# Patient Record
Sex: MALE | NOT HISPANIC OR LATINO | Employment: OTHER | URBAN - METROPOLITAN AREA
[De-identification: names, ages, dates, MRNs, and addresses within clinical notes are randomized per-mention and may not be internally consistent; named-entity substitution may affect disease eponyms.]

---

## 2023-02-28 LAB
ALANINE AMINOTRANSFERASE (SGPT) (U/L) IN SER/PLAS: 25 U/L (ref 10–52)
ALBUMIN (G/DL) IN SER/PLAS: 3.9 G/DL (ref 3.4–5)
ALKALINE PHOSPHATASE (U/L) IN SER/PLAS: 67 U/L (ref 33–136)
ANION GAP IN SER/PLAS: 14 MMOL/L (ref 10–20)
ASPARTATE AMINOTRANSFERASE (SGOT) (U/L) IN SER/PLAS: 13 U/L (ref 9–39)
BILIRUBIN TOTAL (MG/DL) IN SER/PLAS: 0.7 MG/DL (ref 0–1.2)
CALCIUM (MG/DL) IN SER/PLAS: 9.5 MG/DL (ref 8.6–10.6)
CARBON DIOXIDE, TOTAL (MMOL/L) IN SER/PLAS: 33 MMOL/L (ref 21–32)
CHLORIDE (MMOL/L) IN SER/PLAS: 98 MMOL/L (ref 98–107)
CHOLESTEROL (MG/DL) IN SER/PLAS: 121 MG/DL (ref 0–199)
CHOLESTEROL IN HDL (MG/DL) IN SER/PLAS: 41.7 MG/DL
CHOLESTEROL/HDL RATIO: 2.9
CREATININE (MG/DL) IN SER/PLAS: 0.98 MG/DL (ref 0.5–1.3)
ERYTHROCYTE DISTRIBUTION WIDTH (RATIO) BY AUTOMATED COUNT: 12.1 % (ref 11.5–14.5)
ERYTHROCYTE MEAN CORPUSCULAR HEMOGLOBIN CONCENTRATION (G/DL) BY AUTOMATED: 33.8 G/DL (ref 32–36)
ERYTHROCYTE MEAN CORPUSCULAR VOLUME (FL) BY AUTOMATED COUNT: 92 FL (ref 80–100)
ERYTHROCYTES (10*6/UL) IN BLOOD BY AUTOMATED COUNT: 4.52 X10E12/L (ref 4.5–5.9)
GFR MALE: 79 ML/MIN/1.73M2
GLUCOSE (MG/DL) IN SER/PLAS: 226 MG/DL (ref 74–99)
HEMATOCRIT (%) IN BLOOD BY AUTOMATED COUNT: 41.7 % (ref 41–52)
HEMOGLOBIN (G/DL) IN BLOOD: 14.1 G/DL (ref 13.5–17.5)
LDL: 57 MG/DL (ref 0–99)
LEUKOCYTES (10*3/UL) IN BLOOD BY AUTOMATED COUNT: 8.9 X10E9/L (ref 4.4–11.3)
NRBC (PER 100 WBCS) BY AUTOMATED COUNT: 0 /100 WBC (ref 0–0)
PLATELETS (10*3/UL) IN BLOOD AUTOMATED COUNT: 197 X10E9/L (ref 150–450)
POTASSIUM (MMOL/L) IN SER/PLAS: 3.9 MMOL/L (ref 3.5–5.3)
PROSTATE SPECIFIC ANTIGEN,SCREEN: 0.94 NG/ML (ref 0–4)
PROTEIN TOTAL: 5.9 G/DL (ref 6.4–8.2)
SODIUM (MMOL/L) IN SER/PLAS: 141 MMOL/L (ref 136–145)
THYROTROPIN (MIU/L) IN SER/PLAS BY DETECTION LIMIT <= 0.05 MIU/L: 1.87 MIU/L (ref 0.44–3.98)
TRIGLYCERIDE (MG/DL) IN SER/PLAS: 110 MG/DL (ref 0–149)
UREA NITROGEN (MG/DL) IN SER/PLAS: 18 MG/DL (ref 6–23)
VLDL: 22 MG/DL (ref 0–40)

## 2023-03-21 ENCOUNTER — OFFICE VISIT (OUTPATIENT)
Dept: PRIMARY CARE | Facility: CLINIC | Age: 78
End: 2023-03-21
Payer: MEDICARE

## 2023-03-21 VITALS
RESPIRATION RATE: 12 BRPM | SYSTOLIC BLOOD PRESSURE: 125 MMHG | WEIGHT: 186 LBS | BODY MASS INDEX: 29.13 KG/M2 | DIASTOLIC BLOOD PRESSURE: 75 MMHG | HEART RATE: 72 BPM

## 2023-03-21 DIAGNOSIS — I10 BENIGN ESSENTIAL HYPERTENSION: ICD-10-CM

## 2023-03-21 DIAGNOSIS — E11.43 DIABETIC AUTONOMIC NEUROPATHY ASSOCIATED WITH TYPE 2 DIABETES MELLITUS (MULTI): Primary | ICD-10-CM

## 2023-03-21 PROBLEM — I48.0 PAROXYSMAL ATRIAL FIBRILLATION (MULTI): Status: ACTIVE | Noted: 2023-03-21

## 2023-03-21 PROBLEM — I95.1 ORTHOSTATIC HYPOTENSION: Status: ACTIVE | Noted: 2023-03-21

## 2023-03-21 PROBLEM — R06.02 EXERTIONAL SHORTNESS OF BREATH: Status: ACTIVE | Noted: 2023-03-21

## 2023-03-21 PROBLEM — B35.1 MYCOTIC TOENAILS: Status: ACTIVE | Noted: 2023-03-21

## 2023-03-21 PROBLEM — S09.90XA HEAD INJURY: Status: ACTIVE | Noted: 2023-03-21

## 2023-03-21 PROBLEM — J32.9 SINOBRONCHITIS: Status: ACTIVE | Noted: 2023-03-21

## 2023-03-21 PROBLEM — M79.672 BILATERAL LEG AND FOOT PAIN: Status: ACTIVE | Noted: 2023-03-21

## 2023-03-21 PROBLEM — L73.9 FOLLICULAR IMPETIGO: Status: ACTIVE | Noted: 2023-03-21

## 2023-03-21 PROBLEM — M17.11 PRIMARY OSTEOARTHRITIS OF RIGHT KNEE: Status: ACTIVE | Noted: 2023-03-21

## 2023-03-21 PROBLEM — M79.671 BILATERAL LEG AND FOOT PAIN: Status: ACTIVE | Noted: 2023-03-21

## 2023-03-21 PROBLEM — R06.00 DYSPNEA: Status: ACTIVE | Noted: 2023-03-21

## 2023-03-21 PROBLEM — N40.0 BPH (BENIGN PROSTATIC HYPERPLASIA): Status: ACTIVE | Noted: 2023-03-21

## 2023-03-21 PROBLEM — H02.403 PTOSIS OF BOTH EYELIDS: Status: ACTIVE | Noted: 2023-03-21

## 2023-03-21 PROBLEM — I87.2 VENOUS INSUFFICIENCY OF LEFT LOWER EXTREMITY: Status: ACTIVE | Noted: 2023-03-21

## 2023-03-21 PROBLEM — E11.9 DIABETES MELLITUS (MULTI): Status: ACTIVE | Noted: 2023-03-21

## 2023-03-21 PROBLEM — R53.1 GENERALIZED WEAKNESS: Status: ACTIVE | Noted: 2023-03-21

## 2023-03-21 PROBLEM — I89.0 LYMPHEDEMA OF LEG: Status: ACTIVE | Noted: 2023-03-21

## 2023-03-21 PROBLEM — R53.83 OTHER FATIGUE: Status: ACTIVE | Noted: 2023-03-21

## 2023-03-21 PROBLEM — N40.0 ENLARGED PROSTATE WITHOUT LOWER URINARY TRACT SYMPTOMS (LUTS): Status: ACTIVE | Noted: 2023-03-21

## 2023-03-21 PROBLEM — R55 SYNCOPE AND COLLAPSE: Status: ACTIVE | Noted: 2023-03-21

## 2023-03-21 PROBLEM — M25.569 KNEE PAIN: Status: ACTIVE | Noted: 2023-03-21

## 2023-03-21 PROBLEM — K59.00 CONSTIPATION: Status: ACTIVE | Noted: 2023-03-21

## 2023-03-21 PROBLEM — F41.8 DEPRESSION WITH ANXIETY: Status: ACTIVE | Noted: 2023-03-21

## 2023-03-21 PROBLEM — M70.20 OLECRANON BURSITIS: Status: ACTIVE | Noted: 2023-03-21

## 2023-03-21 PROBLEM — M54.50 LOWER BACK PAIN: Status: ACTIVE | Noted: 2023-03-21

## 2023-03-21 PROBLEM — M16.12 OSTEOARTHRITIS OF LEFT HIP: Status: ACTIVE | Noted: 2023-03-21

## 2023-03-21 PROBLEM — M79.605 BILATERAL LEG AND FOOT PAIN: Status: ACTIVE | Noted: 2023-03-21

## 2023-03-21 PROBLEM — M54.2 CERVICAL PAIN: Status: ACTIVE | Noted: 2023-03-21

## 2023-03-21 PROBLEM — R42 LIGHTHEADEDNESS: Status: ACTIVE | Noted: 2023-03-21

## 2023-03-21 PROBLEM — E06.9 THYROIDITIS: Status: ACTIVE | Noted: 2023-03-21

## 2023-03-21 PROBLEM — U07.1 COVID-19 VIRUS INFECTION: Status: ACTIVE | Noted: 2023-03-21

## 2023-03-21 PROBLEM — R63.4 WEIGHT LOSS: Status: ACTIVE | Noted: 2023-03-21

## 2023-03-21 PROBLEM — S40.022A CONTUSION OF LEFT ARM: Status: ACTIVE | Noted: 2023-03-21

## 2023-03-21 PROBLEM — M47.812 CERVICAL OSTEOARTHRITIS: Status: ACTIVE | Noted: 2023-03-21

## 2023-03-21 PROBLEM — J40 SINOBRONCHITIS: Status: ACTIVE | Noted: 2023-03-21

## 2023-03-21 PROBLEM — M79.602 PAIN OF LEFT UPPER EXTREMITY: Status: ACTIVE | Noted: 2023-03-21

## 2023-03-21 PROBLEM — G44.209 TENSION HEADACHE: Status: ACTIVE | Noted: 2023-03-21

## 2023-03-21 PROBLEM — N40.0 PROSTATISM: Status: ACTIVE | Noted: 2023-03-21

## 2023-03-21 PROBLEM — S80.10XA HEMATOMA OF LEG: Status: ACTIVE | Noted: 2023-03-21

## 2023-03-21 PROBLEM — M19.042 PRIMARY OSTEOARTHRITIS OF LEFT HAND: Status: ACTIVE | Noted: 2023-03-21

## 2023-03-21 PROBLEM — R25.1 TREMOR: Status: ACTIVE | Noted: 2023-03-21

## 2023-03-21 PROBLEM — N45.3 ORCHITIS AND EPIDIDYMITIS: Status: ACTIVE | Noted: 2023-03-21

## 2023-03-21 PROBLEM — M94.0 COSTOCHONDRITIS: Status: ACTIVE | Noted: 2023-03-21

## 2023-03-21 PROBLEM — E78.2 COMBINED HYPERLIPIDEMIA: Status: ACTIVE | Noted: 2023-03-21

## 2023-03-21 PROBLEM — M79.604 BILATERAL LEG AND FOOT PAIN: Status: ACTIVE | Noted: 2023-03-21

## 2023-03-21 PROBLEM — M25.519 SHOULDER PAIN: Status: ACTIVE | Noted: 2023-03-21

## 2023-03-21 PROCEDURE — 3078F DIAST BP <80 MM HG: CPT | Performed by: INTERNAL MEDICINE

## 2023-03-21 PROCEDURE — 99213 OFFICE O/P EST LOW 20 MIN: CPT | Performed by: INTERNAL MEDICINE

## 2023-03-21 PROCEDURE — 3074F SYST BP LT 130 MM HG: CPT | Performed by: INTERNAL MEDICINE

## 2023-03-21 RX ORDER — LOSARTAN POTASSIUM 25 MG/1
1 TABLET ORAL
COMMUNITY
Start: 2022-05-04 | End: 2024-03-18

## 2023-03-21 RX ORDER — FLUDROCORTISONE ACETATE 0.1 MG/1
0.5 TABLET ORAL 2 TIMES DAILY
COMMUNITY
Start: 2023-02-28 | End: 2023-03-21 | Stop reason: DRUGHIGH

## 2023-03-21 RX ORDER — ATORVASTATIN CALCIUM 10 MG/1
1 TABLET, FILM COATED ORAL
COMMUNITY
Start: 2014-02-18 | End: 2023-11-02

## 2023-03-21 RX ORDER — TAMSULOSIN HYDROCHLORIDE 0.4 MG/1
0.4 CAPSULE ORAL NIGHTLY
COMMUNITY
Start: 2018-03-29 | End: 2023-12-18 | Stop reason: ALTCHOICE

## 2023-03-21 RX ORDER — METFORMIN HYDROCHLORIDE 1000 MG/1
1 TABLET ORAL 2 TIMES DAILY
COMMUNITY
Start: 2023-02-03 | End: 2023-10-20 | Stop reason: SDUPTHER

## 2023-03-21 RX ORDER — DULOXETIN HYDROCHLORIDE 30 MG/1
30 CAPSULE, DELAYED RELEASE ORAL NIGHTLY
COMMUNITY
Start: 2018-07-10 | End: 2023-11-23

## 2023-03-21 RX ORDER — BLOOD SUGAR DIAGNOSTIC
STRIP MISCELLANEOUS
COMMUNITY
Start: 2014-10-29

## 2023-03-21 RX ORDER — FINASTERIDE 5 MG/1
1 TABLET, FILM COATED ORAL
COMMUNITY
Start: 2014-09-14 | End: 2023-11-23

## 2023-03-21 RX ORDER — BLOOD SUGAR DIAGNOSTIC
STRIP MISCELLANEOUS
COMMUNITY

## 2023-03-21 RX ORDER — DILTIAZEM HYDROCHLORIDE 360 MG/1
360 CAPSULE, EXTENDED RELEASE ORAL
COMMUNITY
End: 2023-12-14

## 2023-03-21 RX ORDER — FLUDROCORTISONE ACETATE 0.1 MG/1
TABLET ORAL
Qty: 45 TABLET | Refills: 1 | Status: SHIPPED | OUTPATIENT
Start: 2023-03-21 | End: 2023-04-16 | Stop reason: DRUGHIGH

## 2023-03-24 ENCOUNTER — TELEPHONE (OUTPATIENT)
Dept: PRIMARY CARE | Facility: CLINIC | Age: 78
End: 2023-03-24
Payer: MEDICARE

## 2023-03-25 DIAGNOSIS — R53.1 GENERALIZED WEAKNESS: ICD-10-CM

## 2023-03-25 DIAGNOSIS — I10 PRIMARY HYPERTENSION: ICD-10-CM

## 2023-03-25 DIAGNOSIS — R42 LIGHTHEADEDNESS: ICD-10-CM

## 2023-03-25 DIAGNOSIS — E11.43 DIABETIC AUTONOMIC NEUROPATHY ASSOCIATED WITH TYPE 2 DIABETES MELLITUS (MULTI): Primary | ICD-10-CM

## 2023-04-03 LAB
COBALAMIN (VITAMIN B12) (PG/ML) IN SER/PLAS: 298 PG/ML (ref 211–911)
POTASSIUM (MMOL/L) IN SER/PLAS: 3.5 MMOL/L (ref 3.5–5.3)

## 2023-04-06 LAB
ALBUMIN ELP: 3.6 G/DL (ref 3.4–5)
ALPHA 1: 0.2 G/DL (ref 0.2–0.6)
ALPHA 2: 0.6 G/DL (ref 0.4–1.1)
BETA: 0.6 G/DL (ref 0.5–1.2)
GAMMA GLOBULIN: 0.5 G/DL (ref 0.5–1.4)
PATH REVIEW - SERUM IMMUNOFIXATION: NORMAL
PATH REVIEW-SERUM PROTEIN ELECTROPHORESIS: NORMAL
PROTEIN ELECTROPHORESIS INTERPRETATION: NORMAL
PROTEIN TOTAL: 5.6 G/DL (ref 6.4–8.2)
SERUM IMMUNOFIXATION INTERPRETATION: NORMAL

## 2023-04-10 ENCOUNTER — TELEPHONE (OUTPATIENT)
Dept: PRIMARY CARE | Facility: CLINIC | Age: 78
End: 2023-04-10
Payer: MEDICARE

## 2023-04-16 RX ORDER — FLUDROCORTISONE ACETATE 0.1 MG/1
TABLET ORAL
Qty: 45 TABLET | Refills: 1 | Status: SHIPPED | OUTPATIENT
Start: 2023-04-16 | End: 2023-12-18 | Stop reason: ALTCHOICE

## 2023-04-28 ENCOUNTER — TELEPHONE (OUTPATIENT)
Dept: PRIMARY CARE | Facility: CLINIC | Age: 78
End: 2023-04-28
Payer: MEDICARE

## 2023-07-26 ENCOUNTER — OFFICE VISIT (OUTPATIENT)
Dept: PRIMARY CARE | Facility: CLINIC | Age: 78
End: 2023-07-26
Payer: MEDICARE

## 2023-07-26 VITALS — SYSTOLIC BLOOD PRESSURE: 148 MMHG | DIASTOLIC BLOOD PRESSURE: 82 MMHG

## 2023-07-26 DIAGNOSIS — I10 BENIGN ESSENTIAL HYPERTENSION: Primary | ICD-10-CM

## 2023-07-26 DIAGNOSIS — R42 LIGHTHEADEDNESS: ICD-10-CM

## 2023-07-26 DIAGNOSIS — E11.9 TYPE 2 DIABETES MELLITUS WITHOUT COMPLICATION, UNSPECIFIED WHETHER LONG TERM INSULIN USE (MULTI): ICD-10-CM

## 2023-07-26 PROCEDURE — 3077F SYST BP >= 140 MM HG: CPT | Performed by: INTERNAL MEDICINE

## 2023-07-26 PROCEDURE — 3079F DIAST BP 80-89 MM HG: CPT | Performed by: INTERNAL MEDICINE

## 2023-07-26 PROCEDURE — 99213 OFFICE O/P EST LOW 20 MIN: CPT | Performed by: INTERNAL MEDICINE

## 2023-07-26 NOTE — PROGRESS NOTES
Subjective   Patient ID: Guido Bell is a 77 y.o. male who presents for No chief complaint on file..    HPI follow-up visit and sick visit had some higher blood pressures crusting over 170s systolic recall he had low blood pressure when he was on double Flomax is now on a single Flomax in the morning and doing okay he was on 1-1/2 fludrocortisone at the same time and recently been to the neurologist because of higher blood pressure was reduced to 1/2 tablet of fludrocortisone was still taking potassium plans to get off the fludrocortisone altogether continues with the other blood pressure medication had no headache chest pain shortness of breath or swelling    Review of Systems    Objective   There were no vitals taken for this visit.  Vital signs noted alert and oriented x3 NCAT no JVD or bruit chest clear to auscultation and percussion CV regular rate and rhythm S1-S2 without murmur gallop or rub extremities no clubbing cyanosis or edema normal distal pulses  Physical Exam    Assessment/Plan impression hypertension orthostatic hypotension diabetes  Plan good diet regular exercise increase water consumption will not add additional blood pressure medicine because blood pressure is somewhat better today he is asymptomatic and he has ability to check his blood pressure at home after off of the fludrocortisone and potassium if needed then may reevaluate the blood pressure while checking blood sugars maintaining a diet good water consumption and regular exercise in the meantime recheck in 2 weeks

## 2023-08-24 ENCOUNTER — OFFICE VISIT (OUTPATIENT)
Dept: PRIMARY CARE | Facility: CLINIC | Age: 78
End: 2023-08-24
Payer: MEDICARE

## 2023-08-24 ENCOUNTER — APPOINTMENT (OUTPATIENT)
Dept: PRIMARY CARE | Facility: CLINIC | Age: 78
End: 2023-08-24
Payer: MEDICARE

## 2023-08-24 VITALS — WEIGHT: 185 LBS | BODY MASS INDEX: 28.98 KG/M2 | SYSTOLIC BLOOD PRESSURE: 126 MMHG | DIASTOLIC BLOOD PRESSURE: 76 MMHG

## 2023-08-24 DIAGNOSIS — I10 BENIGN ESSENTIAL HYPERTENSION: Primary | ICD-10-CM

## 2023-08-24 DIAGNOSIS — E11.9 TYPE 2 DIABETES MELLITUS WITHOUT COMPLICATION, UNSPECIFIED WHETHER LONG TERM INSULIN USE (MULTI): ICD-10-CM

## 2023-08-24 DIAGNOSIS — R42 LIGHTHEADEDNESS: ICD-10-CM

## 2023-08-24 LAB
ANION GAP IN SER/PLAS: 12 MMOL/L (ref 10–20)
CALCIUM (MG/DL) IN SER/PLAS: 9.6 MG/DL (ref 8.6–10.6)
CARBON DIOXIDE, TOTAL (MMOL/L) IN SER/PLAS: 32 MMOL/L (ref 21–32)
CHLORIDE (MMOL/L) IN SER/PLAS: 100 MMOL/L (ref 98–107)
CREATININE (MG/DL) IN SER/PLAS: 1.02 MG/DL (ref 0.5–1.3)
ESTIMATED AVERAGE GLUCOSE FOR HBA1C: 171 MG/DL
GFR MALE: 75 ML/MIN/1.73M2
GLUCOSE (MG/DL) IN SER/PLAS: 174 MG/DL (ref 74–99)
HEMOGLOBIN A1C/HEMOGLOBIN TOTAL IN BLOOD: 7.6 %
POTASSIUM (MMOL/L) IN SER/PLAS: 4.1 MMOL/L (ref 3.5–5.3)
SODIUM (MMOL/L) IN SER/PLAS: 140 MMOL/L (ref 136–145)
UREA NITROGEN (MG/DL) IN SER/PLAS: 21 MG/DL (ref 6–23)

## 2023-08-24 PROCEDURE — 3074F SYST BP LT 130 MM HG: CPT | Performed by: INTERNAL MEDICINE

## 2023-08-24 PROCEDURE — 99213 OFFICE O/P EST LOW 20 MIN: CPT | Performed by: INTERNAL MEDICINE

## 2023-08-24 PROCEDURE — 80048 BASIC METABOLIC PNL TOTAL CA: CPT

## 2023-08-24 PROCEDURE — 83036 HEMOGLOBIN GLYCOSYLATED A1C: CPT

## 2023-08-24 PROCEDURE — 3078F DIAST BP <80 MM HG: CPT | Performed by: INTERNAL MEDICINE

## 2023-08-24 NOTE — PROGRESS NOTES
Subjective   Patient ID: Guido Bell is a 77 y.o. male who presents for No chief complaint on file..    HPI follow-up visit has been doing well overall no episodes in the past month of the lightheadedness is off the fludrocortisone discussed with the Flomax and discussed with the blood sugars as well as the blood pressure medication no chest pain no shortness of breath no palpitation has been reasonably exercising seemingly eating okay perhaps not drinking enough water still    Review of Systems    Objective   There were no vitals taken for this visit.    Physical Exam vital signs noted alert and oriented x3 NCAT no JVD or bruit chest clear to auscultation and percussion CV regular rate and rhythm S1-S2 without murmur gallop or rub extremities no clubbing cyanosis or edema normal distal pulses    Assessment/Plan    impression diabetes mellitus hypertension and lightheadedness orthostasis  Plan check glycohemoglobin advised on long-term blood sugar test overall good diet regular exercise good water consumption weight stability or further weight loss continue with current medications check Chem-7 advised on glucose potassium sodium and kidney function and then follow-up for regular visit based on above and medications    Check previous medications (check)

## 2023-10-20 DIAGNOSIS — E11.9 TYPE 2 DIABETES MELLITUS WITHOUT COMPLICATION, UNSPECIFIED WHETHER LONG TERM INSULIN USE (MULTI): Primary | ICD-10-CM

## 2023-10-21 RX ORDER — METFORMIN HYDROCHLORIDE 1000 MG/1
1000 TABLET ORAL 2 TIMES DAILY
Qty: 180 TABLET | Refills: 0 | Status: SHIPPED | OUTPATIENT
Start: 2023-10-21 | End: 2024-01-10

## 2023-10-30 DIAGNOSIS — E78.2 MIXED HYPERLIPIDEMIA: ICD-10-CM

## 2023-11-02 RX ORDER — ATORVASTATIN CALCIUM 10 MG/1
10 TABLET, FILM COATED ORAL DAILY
Qty: 30 TABLET | Refills: 1 | Status: SHIPPED | OUTPATIENT
Start: 2023-11-02 | End: 2023-11-06 | Stop reason: SDUPTHER

## 2023-11-06 DIAGNOSIS — E78.2 MIXED HYPERLIPIDEMIA: ICD-10-CM

## 2023-11-06 RX ORDER — ATORVASTATIN CALCIUM 10 MG/1
10 TABLET, FILM COATED ORAL DAILY
Qty: 90 TABLET | Refills: 2 | Status: SHIPPED
Start: 2023-11-06 | End: 2023-12-18 | Stop reason: SDUPTHER

## 2023-11-14 DIAGNOSIS — I48.0 PAROXYSMAL ATRIAL FIBRILLATION (MULTI): Primary | ICD-10-CM

## 2023-11-17 DIAGNOSIS — Z00.00 ENCOUNTER FOR GENERAL ADULT MEDICAL EXAMINATION WITHOUT ABNORMAL FINDINGS: ICD-10-CM

## 2023-11-20 RX ORDER — APIXABAN 5 MG/1
5 TABLET, FILM COATED ORAL 2 TIMES DAILY
Qty: 180 TABLET | Refills: 3 | Status: SHIPPED | OUTPATIENT
Start: 2023-11-20

## 2023-11-22 ENCOUNTER — TELEPHONE (OUTPATIENT)
Dept: PRIMARY CARE | Facility: CLINIC | Age: 78
End: 2023-11-22
Payer: MEDICARE

## 2023-11-23 RX ORDER — DULOXETIN HYDROCHLORIDE 30 MG/1
30 CAPSULE, DELAYED RELEASE ORAL NIGHTLY
Qty: 90 CAPSULE | Refills: 1 | Status: SHIPPED | OUTPATIENT
Start: 2023-11-23

## 2023-11-23 RX ORDER — FINASTERIDE 5 MG/1
5 TABLET, FILM COATED ORAL DAILY
Qty: 90 TABLET | Refills: 1 | Status: SHIPPED | OUTPATIENT
Start: 2023-11-23 | End: 2024-04-09

## 2023-11-27 ENCOUNTER — APPOINTMENT (OUTPATIENT)
Dept: GASTROENTEROLOGY | Facility: CLINIC | Age: 78
End: 2023-11-27
Payer: MEDICARE

## 2023-12-11 DIAGNOSIS — I10 HYPERTENSION, UNSPECIFIED TYPE: Primary | ICD-10-CM

## 2023-12-11 DIAGNOSIS — E11.9 TYPE 2 DIABETES MELLITUS WITHOUT COMPLICATION, UNSPECIFIED WHETHER LONG TERM INSULIN USE (MULTI): Primary | ICD-10-CM

## 2023-12-12 RX ORDER — BLOOD-GLUCOSE METER
KIT MISCELLANEOUS
Qty: 100 STRIP | Refills: 3 | Status: SHIPPED | OUTPATIENT
Start: 2023-12-12

## 2023-12-14 RX ORDER — DILTIAZEM HYDROCHLORIDE 360 MG/1
360 CAPSULE, EXTENDED RELEASE ORAL DAILY
Qty: 90 CAPSULE | Refills: 3 | Status: SHIPPED | OUTPATIENT
Start: 2023-12-14 | End: 2023-12-21 | Stop reason: SDUPTHER

## 2023-12-16 ENCOUNTER — APPOINTMENT (OUTPATIENT)
Dept: RADIOLOGY | Facility: HOSPITAL | Age: 78
End: 2023-12-16
Payer: MEDICARE

## 2023-12-16 ENCOUNTER — HOSPITAL ENCOUNTER (EMERGENCY)
Facility: HOSPITAL | Age: 78
Discharge: HOME | End: 2023-12-16
Payer: MEDICARE

## 2023-12-16 VITALS
DIASTOLIC BLOOD PRESSURE: 94 MMHG | HEIGHT: 67 IN | TEMPERATURE: 98 F | SYSTOLIC BLOOD PRESSURE: 159 MMHG | HEART RATE: 81 BPM | WEIGHT: 184 LBS | RESPIRATION RATE: 16 BRPM | OXYGEN SATURATION: 97 % | BODY MASS INDEX: 28.88 KG/M2

## 2023-12-16 DIAGNOSIS — M79.605 PAIN OF LEFT LOWER EXTREMITY: Primary | ICD-10-CM

## 2023-12-16 PROCEDURE — 2500000004 HC RX 250 GENERAL PHARMACY W/ HCPCS (ALT 636 FOR OP/ED): Performed by: PHYSICIAN ASSISTANT

## 2023-12-16 PROCEDURE — 73552 X-RAY EXAM OF FEMUR 2/>: CPT | Mod: LT,FY

## 2023-12-16 PROCEDURE — 2500000005 HC RX 250 GENERAL PHARMACY W/O HCPCS: Performed by: PHYSICIAN ASSISTANT

## 2023-12-16 PROCEDURE — 99283 EMERGENCY DEPT VISIT LOW MDM: CPT

## 2023-12-16 PROCEDURE — 2500000001 HC RX 250 WO HCPCS SELF ADMINISTERED DRUGS (ALT 637 FOR MEDICARE OP): Performed by: PHYSICIAN ASSISTANT

## 2023-12-16 PROCEDURE — 73552 X-RAY EXAM OF FEMUR 2/>: CPT | Mod: LEFT SIDE | Performed by: RADIOLOGY

## 2023-12-16 RX ORDER — CYCLOBENZAPRINE HCL 10 MG
10 TABLET ORAL ONCE
Status: COMPLETED | OUTPATIENT
Start: 2023-12-16 | End: 2023-12-16

## 2023-12-16 RX ORDER — LIDOCAINE 560 MG/1
1 PATCH PERCUTANEOUS; TOPICAL; TRANSDERMAL ONCE
Status: DISCONTINUED | OUTPATIENT
Start: 2023-12-16 | End: 2023-12-16 | Stop reason: HOSPADM

## 2023-12-16 RX ORDER — PREDNISONE 20 MG/1
40 TABLET ORAL DAILY
Qty: 8 TABLET | Refills: 0 | Status: SHIPPED | OUTPATIENT
Start: 2023-12-16 | End: 2023-12-20

## 2023-12-16 RX ORDER — PREDNISONE 20 MG/1
60 TABLET ORAL ONCE
Status: COMPLETED | OUTPATIENT
Start: 2023-12-16 | End: 2023-12-16

## 2023-12-16 RX ORDER — LIDOCAINE 50 MG/G
1 PATCH TOPICAL DAILY
Qty: 30 PATCH | Refills: 0 | Status: SHIPPED | OUTPATIENT
Start: 2023-12-16 | End: 2024-01-02 | Stop reason: SDUPTHER

## 2023-12-16 RX ORDER — OXYCODONE AND ACETAMINOPHEN 5; 325 MG/1; MG/1
1 TABLET ORAL ONCE
Status: COMPLETED | OUTPATIENT
Start: 2023-12-16 | End: 2023-12-16

## 2023-12-16 RX ORDER — CYCLOBENZAPRINE HCL 10 MG
10 TABLET ORAL 3 TIMES DAILY PRN
Qty: 15 TABLET | Refills: 0 | Status: SHIPPED | OUTPATIENT
Start: 2023-12-16 | End: 2023-12-18 | Stop reason: ALTCHOICE

## 2023-12-16 RX ADMIN — CYCLOBENZAPRINE 10 MG: 10 TABLET, FILM COATED ORAL at 11:11

## 2023-12-16 RX ADMIN — OXYCODONE HYDROCHLORIDE AND ACETAMINOPHEN 1 TABLET: 5; 325 TABLET ORAL at 10:06

## 2023-12-16 RX ADMIN — LIDOCAINE 1 PATCH: 4 PATCH TOPICAL at 11:11

## 2023-12-16 RX ADMIN — PREDNISONE 60 MG: 20 TABLET ORAL at 10:06

## 2023-12-16 ASSESSMENT — COLUMBIA-SUICIDE SEVERITY RATING SCALE - C-SSRS
6. HAVE YOU EVER DONE ANYTHING, STARTED TO DO ANYTHING, OR PREPARED TO DO ANYTHING TO END YOUR LIFE?: NO
2. HAVE YOU ACTUALLY HAD ANY THOUGHTS OF KILLING YOURSELF?: NO
1. IN THE PAST MONTH, HAVE YOU WISHED YOU WERE DEAD OR WISHED YOU COULD GO TO SLEEP AND NOT WAKE UP?: NO

## 2023-12-16 ASSESSMENT — PAIN DESCRIPTION - LOCATION: LOCATION: LEG

## 2023-12-16 ASSESSMENT — PAIN - FUNCTIONAL ASSESSMENT
PAIN_FUNCTIONAL_ASSESSMENT: 0-10
PAIN_FUNCTIONAL_ASSESSMENT: 0-10

## 2023-12-16 ASSESSMENT — PAIN SCALES - GENERAL
PAINLEVEL_OUTOF10: 10 - WORST POSSIBLE PAIN
PAINLEVEL_OUTOF10: 8

## 2023-12-16 ASSESSMENT — PAIN DESCRIPTION - PAIN TYPE: TYPE: ACUTE PAIN

## 2023-12-16 NOTE — ED PROVIDER NOTES
HPI   Chief Complaint   Patient presents with    Leg Pain       Is a 78-year-old male coming in for left thigh pain.  He reports no injury.  It started 2 days ago.  Patient has had similar episodes before in the past however this 1 seems more severe.  He states its located to the lateral aspect of the left leg and radiates to the anterior aspect.  He states that he is been told its his sciatic nerve before in the past.  Patient has tried OTC medications for discomfort.  No alleviation.  He states his pain is worse with ambulating.  He does have a history of hip replacement to this left side.  Again he denies any falls or injury.  No incontinence.  No saddle anesthesia.  No other areas of pain or discomfort.      History provided by:  Patient                      No data recorded                Patient History   Past Medical History:   Diagnosis Date    Elevated prostate specific antigen (PSA)     Elevated prostate specific antigen (PSA)    Other disorders of intestinal carbohydrate absorption     Other disorders of intestinal carbohydrate absorption    Personal history of other diseases of male genital organs     History of benign prostatic hypertrophy    Personal history of other diseases of the circulatory system     History of hypertension    Personal history of other diseases of the nervous system and sense organs 09/09/2018    History of tension headache    Personal history of other endocrine, nutritional and metabolic disease     History of diabetes mellitus    Personal history of other endocrine, nutritional and metabolic disease     History of hyperlipidemia    Rectal polyp 07/11/2014    Rectal polyp    Spondylosis without myelopathy or radiculopathy, cervical region 06/02/2018    Cervical osteoarthritis     Past Surgical History:   Procedure Laterality Date    TOTAL HIP ARTHROPLASTY  12/09/2013    Total Hip Replacement     Family History   Problem Relation Name Age of Onset    Other (arteriosclerotic  cardiovascular disease) Father       Social History     Tobacco Use    Smoking status: Not on file    Smokeless tobacco: Not on file   Substance Use Topics    Alcohol use: Not on file    Drug use: Not on file       Physical Exam   ED Triage Vitals [12/16/23 0925]   Temp Heart Rate Resp BP   36.7 °C (98 °F) 88 20 (!) 163/97      SpO2 Temp Source Heart Rate Source Patient Position   97 % Temporal Monitor Sitting      BP Location FiO2 (%)     Left arm --       Physical Exam  Vitals and nursing note reviewed.   Constitutional:       General: He is not in acute distress.     Appearance: Normal appearance. He is not toxic-appearing.   HENT:      Head: Normocephalic and atraumatic.      Nose: Nose normal.      Mouth/Throat:      Mouth: Mucous membranes are moist.      Pharynx: Oropharynx is clear.   Eyes:      Extraocular Movements: Extraocular movements intact.      Conjunctiva/sclera: Conjunctivae normal.      Pupils: Pupils are equal, round, and reactive to light.   Cardiovascular:      Rate and Rhythm: Regular rhythm.      Pulses: Normal pulses.      Heart sounds: Normal heart sounds.   Pulmonary:      Effort: Pulmonary effort is normal. No respiratory distress.      Breath sounds: Normal breath sounds.   Abdominal:      General: Abdomen is flat. Bowel sounds are normal.      Palpations: Abdomen is soft.      Tenderness: There is no abdominal tenderness.   Musculoskeletal:         General: Normal range of motion.      Cervical back: Normal range of motion and neck supple.      Comments: No pain on palpation to the left lower extremity.  No pain with movement of the left lower extremity.  No obvious deformity.   Skin:     General: Skin is warm and dry.      Coloration: Skin is not jaundiced or pale.      Findings: No bruising.   Neurological:      General: No focal deficit present.      Mental Status: He is alert and oriented to person, place, and time. Mental status is at baseline.   Psychiatric:         Mood and  Affect: Mood normal.         Behavior: Behavior normal.         ED Course & MDM   ED Course as of 12/16/23 1046   Sat Dec 16, 2023   1035 XR femur left 2+ views [RS]      ED Course User Index  [RS] Alan Boo PA-C         Diagnoses as of 12/16/23 1046   Pain of left lower extremity       Medical Decision Making  Summary:  Medical Decision Making:   Patient presented as described in HPI. Patient case including ROS, PE, and treatment and plan discussed with ED attending if attached as cosigner. Results from labs and or imaging included below if completed. Guido Bell  is a 78 y.o. coming in for Patient presents with:  Leg Pain  .  Due to patient's history of surgical interventions imaging will be completed to the left femur.  There is no reproducible pain on palpation.  Patient is given a Percocet for his discomfort as well as steroids.  He has no weakness or decrease sensation to the leg.  Due to this patient is advised plan that includes pain control at home that include steroids, muscle relaxants, Tylenol, Lidoderm patches.  He will follow-up with his PCP or orthopedic doctor.  He states he has an orthopedic appointment on the 28th but will contact his PCP for further evaluation on Monday.    Patient was advised to follow up with PCP or recommended provider in 2-3 days for another evaluation and exam. I advised patient/guardian to return or go to closest emergency room immediately if symptoms change, get worse, new symptoms develop prior to follow up. If there is no improvement in symptoms in the next 24 hours they are advised to return for further evaluation and exam. I also explained the plan and treatment course. Patient/guardian is in agreement with plan, treatment course, and follow up and states verbally that they will comply.    Labs Reviewed - No data to display   XR femur left 2+ views   Final Result    Status post total left hip arthroplasty.          No sign of hardware failure.          Mild  osteopenia.          Nonaggressive periosteal reaction along the posterior cortex of the    mid and distal femoral diaphysis.          No sign of acute osseous abnormality.          Signed by: Cem Grossman 12/16/2023 10:33 AM    Dictation workstation:   [id number]          Tests/Medications/Escalations of Care considered but not given: As in MDM    Patient care discussed with: N/A  Social Determinants affecting care: N/A    Final diagnosis and disposition as documented       Homegoing. I discussed the differential; results and discharge plan with the patient and/or family/friend/caregiver if present.  I emphasized the importance of follow-up with the physician I referred them to in the timeframe recommended.  I explained reasons for the patient to return to the Emergency Department. They agreed that if they feel their condition is worsening or if they have any other concern they should call 911 immediately for further assistance. I gave the patient an opportunity to ask all questions they had and answered all of them accordingly. They understand return precautions and discharge instructions. The patient and/or family/friend/caregiver expressed understanding verbally and that they would comply.     Disposition: Discharge      This note has been transcribed using voice recognition and may contain grammatical errors, misplaced words, incorrect words, incorrect phrases or other errors.         Procedure  Procedures     Alan Boo PA-C  12/16/23 1058

## 2023-12-18 ENCOUNTER — OFFICE VISIT (OUTPATIENT)
Dept: CARDIOLOGY | Facility: HOSPITAL | Age: 78
End: 2023-12-18
Payer: MEDICARE

## 2023-12-18 VITALS
BODY MASS INDEX: 29.66 KG/M2 | HEART RATE: 95 BPM | HEIGHT: 67 IN | SYSTOLIC BLOOD PRESSURE: 171 MMHG | DIASTOLIC BLOOD PRESSURE: 91 MMHG | WEIGHT: 189 LBS

## 2023-12-18 DIAGNOSIS — I48.0 PAROXYSMAL ATRIAL FIBRILLATION (MULTI): Primary | ICD-10-CM

## 2023-12-18 DIAGNOSIS — E78.2 MIXED HYPERLIPIDEMIA: ICD-10-CM

## 2023-12-18 DIAGNOSIS — I10 HYPERTENSION, UNSPECIFIED TYPE: ICD-10-CM

## 2023-12-18 DIAGNOSIS — I10 PRIMARY HYPERTENSION: ICD-10-CM

## 2023-12-18 PROCEDURE — 3080F DIAST BP >= 90 MM HG: CPT | Performed by: INTERNAL MEDICINE

## 2023-12-18 PROCEDURE — 3077F SYST BP >= 140 MM HG: CPT | Performed by: INTERNAL MEDICINE

## 2023-12-18 PROCEDURE — 1159F MED LIST DOCD IN RCRD: CPT | Performed by: INTERNAL MEDICINE

## 2023-12-18 PROCEDURE — 99214 OFFICE O/P EST MOD 30 MIN: CPT | Performed by: INTERNAL MEDICINE

## 2023-12-18 PROCEDURE — 1036F TOBACCO NON-USER: CPT | Performed by: INTERNAL MEDICINE

## 2023-12-18 PROCEDURE — 1125F AMNT PAIN NOTED PAIN PRSNT: CPT | Performed by: INTERNAL MEDICINE

## 2023-12-18 PROCEDURE — 93005 ELECTROCARDIOGRAM TRACING: CPT | Performed by: INTERNAL MEDICINE

## 2023-12-18 RX ORDER — ATORVASTATIN CALCIUM 10 MG/1
10 TABLET, FILM COATED ORAL DAILY
Qty: 90 TABLET | Refills: 2 | Status: SHIPPED | OUTPATIENT
Start: 2023-12-18

## 2023-12-18 ASSESSMENT — ENCOUNTER SYMPTOMS
OCCASIONAL FEELINGS OF UNSTEADINESS: 0
DEPRESSION: 0
LOSS OF SENSATION IN FEET: 0

## 2023-12-18 NOTE — PROGRESS NOTES
"Chief Complaint:   No chief complaint on file.     History Of Present Illness:    Guido Bell is a 78 y.o. male here for followup. After an episode of persistent palpitations with malaise 5/2018 he was diagnosed with atrial fibrillation. Spontaneously converted to NSR. Had rate control with diltiazem gtt which was ultimately converted to oral. Was started on Eliquis for AC.      Reports doing well overall. Denies any recurrent atrial fibrillation events to the best of his knowledge. He denies CV symptoms. Does not drink alcohol. Has a Smartwatch for HR/arrythymia surveillance but has not set it up yet. We set it up together at today's visit. Has not checked home BP's as of late. Is on steroids for sciatica pain.            Last Recorded Vitals:  Vitals:    12/18/23 1339   BP: (!) 171/91   BP Location: Right arm   Patient Position: Sitting   Pulse: 95   Weight: 85.7 kg (189 lb)   Height: 1.702 m (5' 7\")             Allergies:  Patient has no known allergies.    Outpatient Medications:  Current Outpatient Medications   Medication Instructions    atorvastatin (LIPITOR) 10 mg, oral, Daily    blood sugar diagnostic (Accu-Chek Teresa Plus test strp) strip TEST AS DIRECTED, EVERY OTHER DAY AND AS NEEDED FOR LOW BLOOD SUGAR.    DOCUSATE CALCIUM ORAL 1 capsule, oral, Every Day, Docusate 240mg    DULoxetine (CYMBALTA) 30 mg, oral, Nightly    Eliquis 5 mg, oral, 2 times daily    finasteride (PROSCAR) 5 mg, oral, Daily    FreeStyle Lite Strips strip USE AS DIRECTED 1X PER DAY    FreeStyle Test strip miscellaneous, Use as directed 1x per  day    lidocaine (Lidoderm) 5 % patch 1 patch, transdermal, Daily, Remove & discard patch within 12 hours or as directed by MD.    losartan (Cozaar) 25 mg tablet 1 tablet, oral, Every Day    metFORMIN (GLUCOPHAGE) 1,000 mg, oral, 2 times daily    predniSONE (DELTASONE) 40 mg, oral, Daily    Tiadylt  mg, oral, Daily         Physical Exam:  Gen Well appearing septuagenarian male sitting " up in NAD. Body mass index is 29.6 kg/m².   CV rrr. No m/r/g appreciated. No JVD or leg edema. Pulses 2+ and symmetric.    Pulm Lungs clear with normal respiratory effort.  Neuro Alert and conversant. Grossly nonfocal.       I reviewed the patient's ECG -  NSR    I reviewed most recent imaging / labs / and office notes as well as details of any recent ED visits.    Assessment/Plan   1. Atrial fibrillation  Paroxysmal. No recent episodes. He has been symptomatic with recurrences in the past despite evidence of rate control. We had discussed rhythm control options and he has declined citing overall tolerance for recurrences. He was previously cautioned that in the future his episodes may be longer lasting and may not be tolerable and that rhythm control could be initiated should that occur. He is on Eliquis for AC and despite his prior bleeding issues and appears to be tolerating it well as of late though it is proving cost prohibitive. Will refer to the clinical pharmacy clinic for finance options.      2. HTN  BP uncontrolled today on steroids. He was instructed to check home BP's after he has completed his steroid course and call if they remain elevated (>140 mmhg systolic). Low threshold for increased losartan dosing.        Followup 6 mos.        Luis Fonseca MD

## 2023-12-19 ENCOUNTER — OFFICE VISIT (OUTPATIENT)
Dept: GASTROENTEROLOGY | Facility: HOSPITAL | Age: 78
End: 2023-12-19
Payer: MEDICARE

## 2023-12-19 VITALS — OXYGEN SATURATION: 96 % | HEART RATE: 80 BPM

## 2023-12-19 DIAGNOSIS — K59.09 OTHER CONSTIPATION: Primary | ICD-10-CM

## 2023-12-19 DIAGNOSIS — D12.4 ADENOMATOUS POLYP OF DESCENDING COLON: ICD-10-CM

## 2023-12-19 PROCEDURE — 1036F TOBACCO NON-USER: CPT

## 2023-12-19 PROCEDURE — 99214 OFFICE O/P EST MOD 30 MIN: CPT

## 2023-12-19 PROCEDURE — 1159F MED LIST DOCD IN RCRD: CPT

## 2023-12-19 PROCEDURE — 1125F AMNT PAIN NOTED PAIN PRSNT: CPT

## 2023-12-19 RX ORDER — SODIUM PICOSULFATE, MAGNESIUM OXIDE, AND ANHYDROUS CITRIC ACID 12; 3.5; 1 G/175ML; G/175ML; MG/175ML
2 LIQUID ORAL ONCE
Qty: 175 ML | Refills: 0 | Status: SHIPPED | OUTPATIENT
Start: 2023-12-19 | End: 2023-12-19

## 2023-12-19 ASSESSMENT — ENCOUNTER SYMPTOMS
ANAL BLEEDING: 0
CONSTIPATION: 1
VOMITING: 0
SHORTNESS OF BREATH: 0
BLOOD IN STOOL: 0
RECTAL PAIN: 0
ABDOMINAL PAIN: 0
ABDOMINAL DISTENTION: 0
NAUSEA: 0
COUGH: 0
CHILLS: 0
DIARRHEA: 0
FEVER: 0
APPETITE CHANGE: 0
FATIGUE: 0
TROUBLE SWALLOWING: 0

## 2023-12-19 NOTE — PATIENT INSTRUCTIONS
Please schedule your colonoscopy. You will need a ride since this involves sedation.  I will send a bowel prep to your pharmacy.  Clear liquid diet the day before the procedure. Start the 1st part of the prep at 6 pm the night prior and the other half 5 hrs before the procedure.  Please hold your Eliquis 2 days prior to your procedure.  Confirm with your prescribing physician.  I recommend taking 1 capful of Miralax daily in 8 oz of liquid  Follow up as needed

## 2023-12-19 NOTE — PROGRESS NOTES
Subjective     History of Present Illness:   Guido Bell is a 78 y.o. male with PMHx of BCC, HLD, constipation, costochondritis, diabetes, HTN, A-fib on Eliquis, weight loss who presents to GI clinic for further evaluation surveillance colonoscopy    Today, patient and his wife are present for visit.  States they are here to schedule a surveillance colonoscopy and patient is on Eliquis for A-fib.  Moves bowels every day but stool is hard and he strains.  Patient states that he is unable to tolerate GoLytely because it makes him vomit profusely.  Denies  diarrhea, dyspepsia, melena, hematochezia, dysphagia, unintentional weight loss    Denies ETOH, smoking, marijuana  Denies fxh GI cancer or IBD  Abdominal Surgeries: cholecystectomy    Last colonoscopy 10/2020 Dr. Aparicio for surveillance: Two 8 to 10 mm tubular adenoma descending and hepatic flexure, sigmoid and descending diverticulosis, internal hemorrhoids.  Repeat 3 years  Last EGD   2018 echocardiogram: Normal EF  Past Medical History  As per HPI.     Social History  he  reports that he has never smoked. He has never used smokeless tobacco.     Family History  his family history includes arteriosclerotic cardiovascular disease in his father.     Review of Systems  Review of Systems   Constitutional:  Negative for appetite change, chills, fatigue and fever.   HENT:  Negative for trouble swallowing.    Respiratory:  Negative for cough and shortness of breath.    Gastrointestinal:  Positive for constipation. Negative for abdominal distention, abdominal pain, anal bleeding, blood in stool, diarrhea, nausea, rectal pain and vomiting.       Allergies  No Known Allergies    Medications  Current Outpatient Medications   Medication Instructions    atorvastatin (LIPITOR) 10 mg, oral, Daily    blood sugar diagnostic (Accu-Chek Teresa Plus test strp) strip TEST AS DIRECTED, EVERY OTHER DAY AND AS NEEDED FOR LOW BLOOD SUGAR.    DOCUSATE CALCIUM ORAL 1 capsule, oral, Every  Day, Docusate 240mg    DULoxetine (CYMBALTA) 30 mg, oral, Nightly    Eliquis 5 mg, oral, 2 times daily    finasteride (PROSCAR) 5 mg, oral, Daily    FreeStyle Lite Strips strip USE AS DIRECTED 1X PER DAY    FreeStyle Test strip miscellaneous, Use as directed 1x per  day    lidocaine (Lidoderm) 5 % patch 1 patch, transdermal, Daily, Remove & discard patch within 12 hours or as directed by MD.    losartan (Cozaar) 25 mg tablet 1 tablet, oral, Every Day    metFORMIN (GLUCOPHAGE) 1,000 mg, oral, 2 times daily    predniSONE (DELTASONE) 40 mg, oral, Daily    Tiadylt  mg, oral, Daily        Objective   There were no vitals taken for this visit.   Physical Exam  Constitutional:       Appearance: Normal appearance. He is normal weight.   HENT:      Mouth/Throat:      Mouth: Mucous membranes are dry.      Pharynx: Oropharynx is clear.   Cardiovascular:      Rate and Rhythm: Normal rate and regular rhythm.   Pulmonary:      Effort: Pulmonary effort is normal.      Breath sounds: Normal breath sounds. No wheezing or rhonchi.   Abdominal:      General: Abdomen is flat. Bowel sounds are normal. There is no distension.      Palpations: Abdomen is soft. There is no hepatomegaly.      Tenderness: There is no abdominal tenderness. There is no guarding or rebound. Negative signs include Lanier's sign.      Hernia: No hernia is present.   Musculoskeletal:         General: Normal range of motion.   Skin:     General: Skin is warm and dry.   Neurological:      General: No focal deficit present.      Mental Status: He is alert and oriented to person, place, and time.   Psychiatric:         Mood and Affect: Mood normal.         Behavior: Behavior normal.           Lab Results   Component Value Date    WBC 8.9 02/28/2023    WBC 6.1 12/23/2021    WBC 5.7 10/28/2020    HGB 14.1 02/28/2023    HGB 13.8 12/23/2021    HGB 14.7 10/28/2020    HCT 41.7 02/28/2023    HCT 41.4 12/23/2021    HCT 43.4 10/28/2020     02/28/2023      (L) 12/23/2021     (L) 10/28/2020     Lab Results   Component Value Date     08/24/2023     02/28/2023     12/23/2021    K 4.1 08/24/2023    K 3.5 04/03/2023    K 3.9 02/28/2023     08/24/2023    CL 98 02/28/2023     12/23/2021    CO2 32 08/24/2023    CO2 33 (H) 02/28/2023    CO2 31 12/23/2021    BUN 21 08/24/2023    BUN 18 02/28/2023    BUN 27 (H) 12/23/2021    CREATININE 1.02 08/24/2023    CREATININE 0.98 02/28/2023    CREATININE 1.29 12/23/2021    CALCIUM 9.6 08/24/2023    CALCIUM 9.5 02/28/2023    CALCIUM 9.7 12/23/2021    PROT 5.6 (L) 04/03/2023    PROT 5.9 (L) 02/28/2023    PROT 6.1 (L) 12/23/2021    BILITOT 0.7 02/28/2023    BILITOT 0.6 12/23/2021    BILITOT 0.4 12/22/2020    ALKPHOS 67 02/28/2023    ALKPHOS 78 12/23/2021    ALKPHOS 73 12/22/2020    ALT 25 02/28/2023    ALT 17 12/23/2021    ALT 24 12/22/2020    AST 13 02/28/2023    AST 14 12/23/2021    AST 15 12/22/2020    GLUCOSE 174 (H) 08/24/2023    GLUCOSE 226 (H) 02/28/2023    GLUCOSE 159 (H) 12/23/2021           Guido Bell is a 78 y.o. male who presents to GI clinic for surveillance colonoscopy.    No problem-specific Assessment & Plan notes found for this encounter.         Laura Peres, APRN-CNP

## 2023-12-19 NOTE — ASSESSMENT & PLAN NOTE
-Schedule colonoscopy  -Recommendation to hold Eliquis 2 days prior to procedure    Follow-up as needed

## 2023-12-21 ENCOUNTER — TELEPHONE (OUTPATIENT)
Dept: PRIMARY CARE | Facility: CLINIC | Age: 78
End: 2023-12-21
Payer: MEDICARE

## 2023-12-21 RX ORDER — DILTIAZEM HYDROCHLORIDE 360 MG/1
360 CAPSULE, EXTENDED RELEASE ORAL DAILY
Qty: 90 CAPSULE | Refills: 3 | Status: SHIPPED | OUTPATIENT
Start: 2023-12-21

## 2023-12-22 ENCOUNTER — TELEPHONE (OUTPATIENT)
Dept: PRIMARY CARE | Facility: CLINIC | Age: 78
End: 2023-12-22

## 2023-12-27 ENCOUNTER — ANCILLARY PROCEDURE (OUTPATIENT)
Dept: RADIOLOGY | Facility: CLINIC | Age: 78
End: 2023-12-27
Payer: MEDICARE

## 2023-12-27 ENCOUNTER — OFFICE VISIT (OUTPATIENT)
Dept: PRIMARY CARE | Facility: CLINIC | Age: 78
End: 2023-12-27
Payer: MEDICARE

## 2023-12-27 VITALS — SYSTOLIC BLOOD PRESSURE: 152 MMHG | DIASTOLIC BLOOD PRESSURE: 80 MMHG

## 2023-12-27 DIAGNOSIS — E11.9 TYPE 2 DIABETES MELLITUS WITHOUT COMPLICATION, UNSPECIFIED WHETHER LONG TERM INSULIN USE (MULTI): ICD-10-CM

## 2023-12-27 DIAGNOSIS — I10 BENIGN ESSENTIAL HYPERTENSION: ICD-10-CM

## 2023-12-27 DIAGNOSIS — M54.32 LEFT SIDED SCIATICA: ICD-10-CM

## 2023-12-27 DIAGNOSIS — M54.32 LEFT SIDED SCIATICA: Primary | ICD-10-CM

## 2023-12-27 PROCEDURE — 1125F AMNT PAIN NOTED PAIN PRSNT: CPT | Performed by: INTERNAL MEDICINE

## 2023-12-27 PROCEDURE — 72100 X-RAY EXAM L-S SPINE 2/3 VWS: CPT | Performed by: RADIOLOGY

## 2023-12-27 PROCEDURE — 72100 X-RAY EXAM L-S SPINE 2/3 VWS: CPT

## 2023-12-27 PROCEDURE — 3079F DIAST BP 80-89 MM HG: CPT | Performed by: INTERNAL MEDICINE

## 2023-12-27 PROCEDURE — 3077F SYST BP >= 140 MM HG: CPT | Performed by: INTERNAL MEDICINE

## 2023-12-27 PROCEDURE — 1159F MED LIST DOCD IN RCRD: CPT | Performed by: INTERNAL MEDICINE

## 2023-12-27 PROCEDURE — 1036F TOBACCO NON-USER: CPT | Performed by: INTERNAL MEDICINE

## 2023-12-27 PROCEDURE — 99214 OFFICE O/P EST MOD 30 MIN: CPT | Performed by: INTERNAL MEDICINE

## 2023-12-27 NOTE — PROGRESS NOTES
Subjective   Patient ID: Guido Bell is a 78 y.o. male who presents for No chief complaint on file..    HPI follow-up visit status post recent emergency room had taken x-rays of his femur there was some reaction had taken steroids seemingly helped but not as much as the Lidoderm patches however the distribution of discomfort was in his left sciatic area which she had had before no chest pain no shortness of breath no polyuria polydipsia previous laboratory results reviewed as well as films    Review of Systems    Objective   There were no vitals taken for this visit.    Physical Exam vital signs noted alert and oriented x 3 NCAT no JVD chest clear to auscultation CV regular rate and rhythm S1-S2 extremities no clubbing cyanosis or edema normal distal pulses DTR 2+ on the right 0 on the left no foot drop LS-spine straighten curvature nontender spinous process negative straight leg raise negative logroll negative SI joint tenderness    Assessment/Plan impression hypertension diabetes left sciatica  Plan x-ray film AP lateral requisition made lumbar spine and advised on possibility probability of physical therapy medical spine clinic may still follow-up with orthopedics for the leg portion of his x-rays monitor blood sugar blood pressures have been modestly elevated while in pain may continue to check blood pressures at home and readvised on blood pressure medication diet while watching cho increasing water consumption and exercise after acute pain issue then follow-up for regular visit  [id number]

## 2024-01-02 ENCOUNTER — APPOINTMENT (OUTPATIENT)
Dept: PRIMARY CARE | Facility: CLINIC | Age: 79
End: 2024-01-02
Payer: MEDICARE

## 2024-01-02 DIAGNOSIS — M79.605 PAIN OF LEFT LOWER EXTREMITY: ICD-10-CM

## 2024-01-02 RX ORDER — LIDOCAINE 50 MG/G
1 PATCH TOPICAL DAILY
Qty: 30 PATCH | Refills: 0 | Status: SHIPPED | OUTPATIENT
Start: 2024-01-02 | End: 2024-01-03 | Stop reason: SDUPTHER

## 2024-01-03 DIAGNOSIS — M54.32 LEFT SIDED SCIATICA: Primary | ICD-10-CM

## 2024-01-03 DIAGNOSIS — M79.605 PAIN OF LEFT LOWER EXTREMITY: ICD-10-CM

## 2024-01-03 RX ORDER — LIDOCAINE 50 MG/G
2 PATCH TOPICAL DAILY
Qty: 60 PATCH | Refills: 0 | Status: SHIPPED | OUTPATIENT
Start: 2024-01-03 | End: 2024-02-02

## 2024-01-04 ENCOUNTER — APPOINTMENT (OUTPATIENT)
Dept: GASTROENTEROLOGY | Facility: CLINIC | Age: 79
End: 2024-01-04
Payer: MEDICARE

## 2024-01-08 DIAGNOSIS — E11.9 TYPE 2 DIABETES MELLITUS WITHOUT COMPLICATION, UNSPECIFIED WHETHER LONG TERM INSULIN USE (MULTI): ICD-10-CM

## 2024-01-09 ENCOUNTER — OFFICE VISIT (OUTPATIENT)
Dept: ORTHOPEDIC SURGERY | Facility: CLINIC | Age: 79
End: 2024-01-09
Payer: MEDICARE

## 2024-01-09 VITALS — BODY MASS INDEX: 29.66 KG/M2 | WEIGHT: 189 LBS | HEIGHT: 67 IN

## 2024-01-09 DIAGNOSIS — M54.16 LUMBAR RADICULOPATHY: ICD-10-CM

## 2024-01-09 DIAGNOSIS — M48.10 DISH (DIFFUSE IDIOPATHIC SKELETAL HYPEROSTOSIS): Primary | ICD-10-CM

## 2024-01-09 DIAGNOSIS — M76.32 ILIOTIBIAL BAND SYNDROME OF LEFT SIDE: ICD-10-CM

## 2024-01-09 DIAGNOSIS — D73.4 SPLENIC CYST: ICD-10-CM

## 2024-01-09 PROCEDURE — 99215 OFFICE O/P EST HI 40 MIN: CPT | Performed by: PHYSICIAN ASSISTANT

## 2024-01-09 PROCEDURE — 1036F TOBACCO NON-USER: CPT | Performed by: PHYSICIAN ASSISTANT

## 2024-01-09 PROCEDURE — 1159F MED LIST DOCD IN RCRD: CPT | Performed by: PHYSICIAN ASSISTANT

## 2024-01-09 PROCEDURE — 1125F AMNT PAIN NOTED PAIN PRSNT: CPT | Performed by: PHYSICIAN ASSISTANT

## 2024-01-09 PROCEDURE — 99205 OFFICE O/P NEW HI 60 MIN: CPT | Performed by: PHYSICIAN ASSISTANT

## 2024-01-09 RX ORDER — PREDNISONE 10 MG/1
TABLET ORAL
Qty: 50 TABLET | Refills: 0 | Status: SHIPPED | OUTPATIENT
Start: 2024-01-09 | End: 2024-01-20

## 2024-01-09 RX ORDER — SODIUM PICOSULFATE, MAGNESIUM OXIDE, AND ANHYDROUS CITRIC ACID 12; 3.5; 1 G/175ML; G/175ML; MG/175ML
LIQUID ORAL
COMMUNITY
Start: 2023-12-19

## 2024-01-09 ASSESSMENT — PAIN - FUNCTIONAL ASSESSMENT: PAIN_FUNCTIONAL_ASSESSMENT: 0-10

## 2024-01-09 ASSESSMENT — PAIN SCALES - GENERAL: PAINLEVEL_OUTOF10: 6

## 2024-01-09 NOTE — PROGRESS NOTES
HPI:  Guido Bell is a 78 y.o. male who presents to the spine clinic for evaluation of left leg pain.     Patient reports 3 weeks left thigh pain. Pain radiates lateral aspect of the spine anteriorly to the knee. Denies back pain. Denies pain below the knee. Denies numbness/tingling/weakness.   Pain started abruptly without trauma or inciting event. History of bilateral hip replacements which were uncomplicated.     Presented to ED 12/16/23 - images of left hip obtained and unremarkable. Sent home with scripts for Prednisone 40mg x 4 days, Flexeril, Lidocaine patches, and tylenol. Per patient the prednisone was mildly helpful, has not taken the flexeril. Lido patches have provided most relief.   PCP ordered xrays of the lumbar spine and recommend spine consult.    ROS:  Reviewed on EMR and patient intake sheet.    PMH/SH:  Reviewed on EMR and patient intake sheet.    Exam:  Physical Exam  Spine Musculoskeletal Exam    Well appearing, NAD  Pleasant & cooperative  BMI 29.6  normal ROM lumbar spine with rotation, flexion/extension  No TTP lumbar spine, hips bilaterally  lower extremity sensation intact  4/5 left HF; remaining extremity motor 5/5 throughout  normal gait & station  Negative SLR bilat  No pain with hip external rotation; Lateral thigh pain with internal rotation L hip    Radiology:     Xrays of the lumbar spine dated 12/27/23 personally reviewed. Mild scoliotic curvature. No obvious fractures. Multilevel bridging osteophytes consistent with DISH disease. Multilevel severe degenerative findings throughout the lumbar spine  Large splenic cyst measuring 122mm x 107mm on sagittal image.     Assessment and Plan:  DISH  Lumbar radiculopathy  IT band syndrome  Splenic cyst    I reviewed the imaging studies with Guido and his wife today in detail. Patient with multilevel severe degenerative changes consistent with DISH. DDX for the leg pain lumbar radiculopathy vs ITB syndrome.     Recommend referral for  outpatient PT and MRI lumbar spine. Patient requesting open MRI due to claustrophobia. Will also send in a longer Prednisone taper to see if this provides relief - patient instructed to carefully monitor blood sugars while taking oral steroids.     Of note, splenic mass noted on xrays today. Prior imaging reports state likely splenic cyst and initially noted on prior MRI abdomen dated 05/04/2011. Per patient he was unaware of the cyst and does not recall ever having it evaluated. Recommend follow up with PCP and will send secure chat.     Patient in agreement to plan of care. Of course Guido Bell is welcome to call at anytime with questions or concerns.     Tessie Rangel PA-C  Department of Orthopaedic Surgery    [address] [place] [address]    Voicemail: [phone]   Appts: [phone]  Fax: [phone]

## 2024-01-10 RX ORDER — METFORMIN HYDROCHLORIDE 1000 MG/1
1000 TABLET ORAL 2 TIMES DAILY
Qty: 180 TABLET | Refills: 0 | Status: SHIPPED | OUTPATIENT
Start: 2024-01-10 | End: 2024-04-05

## 2024-01-17 NOTE — PROGRESS NOTES
"Pharmacist Clinic: Anticoagulation Management  Guido Bell was referred to the Clinical Pharmacy Team for his anticoagulation management.    Referring Provider:  Luis Fonseca  _______________________________________________________________________  PHARMACY ASSESSMENT    Allergies Reviewed? No  Home Pharmacy Reviewed? No    Affordability/Accessibility: High copay ~$300 for Eliquis monthly  Adherence/Organization: Reported taking Eliquis twice a day  Adverse Effects: No significant side effects reported      RELEVANT LAB RESULTS  Lab Results   Component Value Date    BILITOT 0.7 02/28/2023    CALCIUM 9.6 08/24/2023    CO2 32 08/24/2023     08/24/2023    CREATININE 1.02 08/24/2023    GLUCOSE 174 (H) 08/24/2023    ALKPHOS 67 02/28/2023    K 4.1 08/24/2023    PROT 5.6 (L) 04/03/2023     08/24/2023    AST 13 02/28/2023    ALT 25 02/28/2023    BUN 21 08/24/2023    ANIONGAP 12 08/24/2023    MG 2.20 04/30/2018    ALBUMIN 3.9 02/28/2023    GFRMALE 75 08/24/2023     Lab Results   Component Value Date    TRIG 110 02/28/2023    CHOL 121 02/28/2023    HDL 41.7 02/28/2023     No results found for: \"BMCBC\", \"CBCDIF\"       DRUG INTERACTIONS  - No  _______________________________________________________________________  ANTICOAGULATION ASSESSMENT    The ASCVD Risk score (Da SOTO, et al., 2019) failed to calculate for the following reasons:    The valid total cholesterol range is 130 to 320 mg/dL    Unable to determine if patient is Non-     DIAGNOSIS: 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  - Patient is projected to be on anticoagulation indefinitely  - [id number] Score: [4] (only included if diagnosis is atrial fibrillation)   Age: [<65 (0)] [65-74 (+1)] [> 75 (+2)]: 2  Sex: [Male/Female (+1)]: 0  CHF history: [No/Yes(+1)]: 0  Hypertension history: [No/Yes(+1)]: 1  Stroke/TIA/thromboembolism history: [No/Yes(+2)]: 0  Vascular disease history (prior MI, " peripheral artery disease, aortic plaque): [No/Yes(+1)]: 0  Diabetes history: [No/Yes(+1)]: 1    CURRENT PHARMACOTHERAPY:   - Elqiuis 5 mg  - 78 years old  - 85.7 kg  - Scr 1.02 mg/dl (8/24/2024)    PERTINANT MEDICAL HISTORY:  - Medical history: PMH significant for hyperlipidemia, Afib, diabetes, orthostatic hypotension  - Medication history: started Eliquis 2-3 years ago for stroke prevention. No significant issue reported    _______________________________________________________________________  PATIENT EDUCATION/GOALS  - Counseled patient on MOA, expectations, duration of therapy, contraindications, administration, and monitoring parameters  - Counseled patient of side effects that are indicative of bleeding such as dark tarry stool, unexplainable bruising, or vomiting up a coffee ground like substance  - Answered all patient questions and concerns  _______________________________________________________________________  RECOMMENDATIONS/PLAN  1. Continue Eliquis 5 mg BID  2. Labs are up to date  3. Patient will follow up with clinical pharmacist once hear back regarding  PAP decision     Patient Assistance Program (PAP)    Patient verbally reports monthly or yearly income which is less than 400% federal poverty level    Application for program to be submitted for the following medications: Elqiuis    Prescription Insurance: Yes  Members of Household: 2  Files Taxes: Yes    Patient will be email financial information to pharmacist directly at [email or web address].    Patient aware this process may take up to 6 weeks.     If approved medication must be filled through [facility] pharmacy and mailed to patient.      Next Cardiology Appointment: 6/19/2024  Type of Encounter: Virtual    Sarwat Jesus PharmD    Verbal consent to manage patient's drug therapy was obtained from the patient . They were informed they may decline to participate or withdraw from participation in pharmacy services at any time.    Continue  all meds under the continuation of care with the referring provider and clinical pharmacy team.

## 2024-01-18 ENCOUNTER — TELEMEDICINE (OUTPATIENT)
Dept: PHARMACY | Facility: HOSPITAL | Age: 79
End: 2024-01-18
Payer: MEDICARE

## 2024-01-18 DIAGNOSIS — I48.0 PAROXYSMAL ATRIAL FIBRILLATION (MULTI): ICD-10-CM

## 2024-01-18 NOTE — Clinical Note
Jesús Fonseca. We are going to try to get Guido signed up for  PAP for his Eliquis. Will keep you updated.

## 2024-01-18 NOTE — PROGRESS NOTES
Physical Therapy  Physical Therapy Orthopedic Evaluation    Patient Name: Guido Bell  MRN: [id number]  Today's Date: 1/22/2024  Time Calculation  Start Time: 1255  Stop Time: 1335  Time Calculation (min): 40 min    Insurance:  Visit number: 1 of mn  Authorization info: no auth  Insurance Type: Payor: [facility] MEDICARE / Plan: [facility] MEDICARE / Product Type: *No Product type* /    Medicare auth:  1/22/24--3/18/24    Referring provider:  JORGE A Fraser  Onset date:  12/31/23    Current Problem  1. Lumbar radiculopathy  Referral to Physical Therapy    Follow Up In Physical Therapy        General:     Precautions:  PMH of hip replacements B  AFIB, diabetic   DISH: diffuse idiopathic skeletal hyperostosis    STEADI - 0  Medical History Form: Reviewed (scanned into chart)    Subjective   STACY: L leg pain, coming from the back he has been told, no back pain.  Insidious onset.  Just got up one morning.  He thinks he had an incident of LE numbness one time.    Chief Complaint: 12/31/23  Onset: 12/7/23    Pain:     Location: L lateral thigh  Description: aching; was throbbing.   Aggravating Factors: walking a lot in bare feet or in nonsupportive footwear.    Relieving Factors:  Prednisone, went to ER, been taking it since 1/9/24  Current: 2/10  Best: 0/10  Worse 10/10  Current Condition:   Better    Social Support: wife at home   Patients Living Environment: Reviewed and no concern    Functional limitations: able to do everything   Exercise/Recreational Activities: play golf  Work/School: retired  Patient's Goal(s) for Therapy: play golf without pain, not have pain or a manageable level of pain when the medication wears off.      Relevant Information (PMH & Previous Tests/Imaging): xrays:  DISH.   Previous Interventions/Treatments: MRI on Wednesday.     Primary Language: English      Red Flags: Do you have any of the following? No  Fever/chills, unexplained weight changes, dizziness/fainting, unexplained change in  bowel or bladder functions, unexplained malaise or muscle weakness, night pain/sweats, numbness or tingling    Objective:    Posture: flat lumbar spine, RS, FH, TK    Transfers:  independent    Gait: WNL     Lumbar ROM:  Flexion WNL, tight hamstrings  Extension 0  SB B 0  Rot 50% restriction B     LE MMT    R/L    hip flexion 4+/4  Ext  4-/3+  (seated)      IR 5/5      ER 5/4-     Abd 5/5; (seated)     Add 4+/4+ (seated)  knee ext 5/5     flex 5/5  ankle DF 5/5     PF 5/5    INV 5/5    EV 5/5    Flexibility:  Hamstring  B ++  Gastroc B ++    Slump: hamstring str but no pain  SLR:  +++    Outcome Measure:     GROC     Treatment:    Exercises  - Supine Bridge  - 1 x daily - 3 sets - 10 reps - 5 hold  - Standing Hip Flexor Stretch  - 1 x daily - 3 reps - 30 hold  - Supine Lower Trunk Rotation  - 2 x daily - 10-20 reps - 5 hold  - Standing Lumbar Extension with Counter  - 3 x daily - 10 reps - 3 hold    EDUCATION:   Access Code: [email or web address]    Home exercise program, plan of care, activity modifications, pain management, and injury pathology     Assessment: Patient presents with signs and symptoms consistent with left lumbar radiculopathy as a result of DISH, resulting in limited participation in pain-free ADLs and inability to perform at their prior level of function. Pt would benefit from physical therapy to address the impairments found & listed previously in the objective section in order to return to safe and pain-free ADLs and prior level of function.     Plan:     Planned Interventions include: therapeutic exercise, self-care home management, manual therapy, therapeutic activities, gait training, neuromuscular coordination, vasopneumatic, dry needling, aquatic therapy  Frequency: 1 x Week  Duration: 8 Weeks  Goals: Set and discussed today  Active       PT Problem       PT Goal 1       Start:  01/22/24    Expected End:  03/18/24       Pt will be independent with HEP  Pt will improve ROM of the lumbar spine by 25%  Pt  will improve strength of B glut max to 4+5; hip flexion and add to 5/5  Pt will demo negative SLR  Pt will report +4 on GROC  Pt will ambulate for 15-30' with 0-4/10 L LE pain               Plan of care was developed with input and agreement by the patient      Jerri Tapia, PT

## 2024-01-22 ENCOUNTER — EVALUATION (OUTPATIENT)
Dept: PHYSICAL THERAPY | Facility: CLINIC | Age: 79
End: 2024-01-22
Payer: MEDICARE

## 2024-01-22 DIAGNOSIS — M54.16 LUMBAR RADICULOPATHY: ICD-10-CM

## 2024-01-22 PROCEDURE — 97110 THERAPEUTIC EXERCISES: CPT | Mod: GP

## 2024-01-22 PROCEDURE — 97161 PT EVAL LOW COMPLEX 20 MIN: CPT | Mod: GP

## 2024-01-22 ASSESSMENT — PATIENT HEALTH QUESTIONNAIRE - PHQ9
1. LITTLE INTEREST OR PLEASURE IN DOING THINGS: NOT AT ALL
SUM OF ALL RESPONSES TO PHQ9 QUESTIONS 1 AND 2: 0
2. FEELING DOWN, DEPRESSED OR HOPELESS: NOT AT ALL

## 2024-01-22 ASSESSMENT — ENCOUNTER SYMPTOMS
LOSS OF SENSATION IN FEET: 0
DEPRESSION: 0
OCCASIONAL FEELINGS OF UNSTEADINESS: 0

## 2024-01-24 ENCOUNTER — TELEPHONE (OUTPATIENT)
Dept: ORTHOPEDIC SURGERY | Facility: CLINIC | Age: 79
End: 2024-01-24
Payer: MEDICARE

## 2024-01-24 ENCOUNTER — APPOINTMENT (OUTPATIENT)
Dept: RADIOLOGY | Facility: CLINIC | Age: 79
End: 2024-01-24
Payer: MEDICARE

## 2024-01-24 ENCOUNTER — TELEPHONE (OUTPATIENT)
Dept: PRIMARY CARE | Facility: CLINIC | Age: 79
End: 2024-01-24

## 2024-01-24 DIAGNOSIS — Z00.00 HEALTH CARE MAINTENANCE: Primary | ICD-10-CM

## 2024-01-24 NOTE — TELEPHONE ENCOUNTER
MRI lumbar denied by insurance - patient needs to complete 6 weeks of PT prior to MRI approval. Patient notified - will resubmit once PT completed.

## 2024-01-26 ENCOUNTER — DOCUMENTATION (OUTPATIENT)
Dept: PHYSICAL THERAPY | Facility: CLINIC | Age: 79
End: 2024-01-26

## 2024-01-26 NOTE — PROGRESS NOTES
Patient is wife presented to today's clinic.  He was doing his exercises at home last night.  He woke up this morning.  He had increased bruising throughout the right hamstring.  He notes that he has mild pain and discomfort.  He took a shower and noticed that the bruising became mildly worse.  I did inspect the patient's leg.  His leg is not overly warm.  No sign of infection, it is mildly tender to palpation.  Overall his vitals are stable, he is not in distress.  He does report being on Eliquis and being very prone to bruising.  However he does not recall if he bumped into anything.  I told him to rest ice and some gentle active range of motion, utilization of compression as needed.  Patient is to call the office or have a follow-up visit with his primary/urgent/ER if symptoms do worsen.  Patient and wife both verbally agreed

## 2024-01-31 ENCOUNTER — APPOINTMENT (OUTPATIENT)
Dept: RADIOLOGY | Facility: CLINIC | Age: 79
End: 2024-01-31
Payer: MEDICARE

## 2024-02-21 ENCOUNTER — APPOINTMENT (OUTPATIENT)
Dept: PHYSICAL THERAPY | Facility: CLINIC | Age: 79
End: 2024-02-21
Payer: MEDICARE

## 2024-03-18 DIAGNOSIS — I10 ESSENTIAL (PRIMARY) HYPERTENSION: Primary | ICD-10-CM

## 2024-03-18 RX ORDER — LOSARTAN POTASSIUM 25 MG/1
25 TABLET ORAL DAILY
Qty: 90 TABLET | Refills: 3 | Status: SHIPPED | OUTPATIENT
Start: 2024-03-18

## 2024-03-19 ENCOUNTER — APPOINTMENT (OUTPATIENT)
Dept: PHYSICAL THERAPY | Facility: CLINIC | Age: 79
End: 2024-03-19
Payer: MEDICARE

## 2024-03-20 ENCOUNTER — APPOINTMENT (OUTPATIENT)
Dept: PHYSICAL THERAPY | Facility: CLINIC | Age: 79
End: 2024-03-20
Payer: MEDICARE

## 2024-03-20 ENCOUNTER — APPOINTMENT (OUTPATIENT)
Dept: GASTROENTEROLOGY | Facility: EXTERNAL LOCATION | Age: 79
End: 2024-03-20
Payer: MEDICARE

## 2024-03-21 ENCOUNTER — DOCUMENTATION (OUTPATIENT)
Dept: PHYSICAL THERAPY | Facility: CLINIC | Age: 79
End: 2024-03-21
Payer: MEDICARE

## 2024-03-21 NOTE — PROGRESS NOTES
Patient Name: Guido Bell  MRN: [id number]  Today's Date: 3/21/2024  Initial examination date: 1/22/24  Referring provider: JORGE A Fraser  Onset date: 12/31/23    Date of discharge: 3/21/24  Date of last visit: 1/22/24  Number of attended visits: 1    Reason referred to physical therapy:  Acute lumbar radic    Therapy addressed the following problems/issues:  Unknown, pt attended IE only    Reason for discharge:    +failed to keep appointments; he canceled all of his fu appts and has not been to PT since January.     Jerri Tapia, PT

## 2024-03-27 ENCOUNTER — APPOINTMENT (OUTPATIENT)
Dept: GASTROENTEROLOGY | Facility: EXTERNAL LOCATION | Age: 79
End: 2024-03-27
Payer: MEDICARE

## 2024-03-27 ENCOUNTER — APPOINTMENT (OUTPATIENT)
Dept: PHYSICAL THERAPY | Facility: CLINIC | Age: 79
End: 2024-03-27
Payer: MEDICARE

## 2024-04-03 ENCOUNTER — APPOINTMENT (OUTPATIENT)
Dept: PHYSICAL THERAPY | Facility: CLINIC | Age: 79
End: 2024-04-03
Payer: MEDICARE

## 2024-04-05 DIAGNOSIS — E11.9 TYPE 2 DIABETES MELLITUS WITHOUT COMPLICATION, UNSPECIFIED WHETHER LONG TERM INSULIN USE (MULTI): ICD-10-CM

## 2024-04-05 RX ORDER — METFORMIN HYDROCHLORIDE 1000 MG/1
1000 TABLET ORAL 2 TIMES DAILY
Qty: 180 TABLET | Refills: 0 | Status: SHIPPED | OUTPATIENT
Start: 2024-04-05

## 2024-04-09 DIAGNOSIS — Z00.00 ENCOUNTER FOR GENERAL ADULT MEDICAL EXAMINATION WITHOUT ABNORMAL FINDINGS: ICD-10-CM

## 2024-04-09 RX ORDER — FINASTERIDE 5 MG/1
5 TABLET, FILM COATED ORAL DAILY
Qty: 90 TABLET | Refills: 1 | Status: SHIPPED | OUTPATIENT
Start: 2024-04-09

## 2024-05-15 ENCOUNTER — OFFICE VISIT (OUTPATIENT)
Dept: GASTROENTEROLOGY | Facility: EXTERNAL LOCATION | Age: 79
End: 2024-05-15
Payer: MEDICARE

## 2024-05-15 DIAGNOSIS — Z12.11 SCREEN FOR COLON CANCER: Primary | ICD-10-CM

## 2024-05-15 DIAGNOSIS — K57.30 DIVERTICULOSIS OF LARGE INTESTINE WITHOUT DIVERTICULITIS: ICD-10-CM

## 2024-05-15 DIAGNOSIS — D12.4 ADENOMATOUS POLYP OF DESCENDING COLON: ICD-10-CM

## 2024-05-15 DIAGNOSIS — Z86.010 PERSONAL HISTORY OF COLONIC POLYPS: ICD-10-CM

## 2024-05-15 DIAGNOSIS — D12.5 BENIGN NEOPLASM OF SIGMOID COLON: ICD-10-CM

## 2024-05-15 DIAGNOSIS — I48.91 ATRIAL FIBRILLATION, UNSPECIFIED TYPE (MULTI): ICD-10-CM

## 2024-05-15 DIAGNOSIS — D12.2 BENIGN NEOPLASM OF ASCENDING COLON: ICD-10-CM

## 2024-05-15 PROCEDURE — 1159F MED LIST DOCD IN RCRD: CPT | Performed by: INTERNAL MEDICINE

## 2024-05-15 PROCEDURE — 88305 TISSUE EXAM BY PATHOLOGIST: CPT

## 2024-05-15 PROCEDURE — 88305 TISSUE EXAM BY PATHOLOGIST: CPT | Performed by: PATHOLOGY

## 2024-05-15 PROCEDURE — 45385 COLONOSCOPY W/LESION REMOVAL: CPT | Performed by: INTERNAL MEDICINE

## 2024-05-16 ENCOUNTER — LAB REQUISITION (OUTPATIENT)
Dept: LAB | Facility: HOSPITAL | Age: 79
End: 2024-05-16
Payer: MEDICARE

## 2024-05-22 ENCOUNTER — TELEPHONE (OUTPATIENT)
Dept: GASTROENTEROLOGY | Facility: CLINIC | Age: 79
End: 2024-05-22
Payer: MEDICARE

## 2024-05-22 NOTE — TELEPHONE ENCOUNTER
----- Message from Charley Velasco MA sent at 5/22/2024  9:06 AM EDT -----  Please call [phone]

## 2024-05-22 NOTE — TELEPHONE ENCOUNTER
Phone - had colonoscopy 5/15, with 5 polyps removed - restarted Eliquis 5/16, and has had some BRBPR since - usually small amounts with brown BM's, had one larger episode - no blood for 2 days, no BM yet today - rec: call me if bleeding recurs

## 2024-05-22 NOTE — TELEPHONE ENCOUNTER
----- Message from Charley Velasco MA sent at 5/22/2024  9:06 AM EDT -----  Please call [phone]

## 2024-05-23 LAB
LABORATORY COMMENT REPORT: NORMAL
PATH REPORT.FINAL DX SPEC: NORMAL
PATH REPORT.GROSS SPEC: NORMAL
PATH REPORT.RELEVANT HX SPEC: NORMAL
PATH REPORT.TOTAL CANCER: NORMAL

## 2024-05-28 ENCOUNTER — OFFICE VISIT (OUTPATIENT)
Dept: PRIMARY CARE | Facility: CLINIC | Age: 79
End: 2024-05-28
Payer: MEDICARE

## 2024-05-28 VITALS
RESPIRATION RATE: 12 BRPM | BODY MASS INDEX: 29.44 KG/M2 | DIASTOLIC BLOOD PRESSURE: 100 MMHG | HEART RATE: 68 BPM | WEIGHT: 188 LBS | SYSTOLIC BLOOD PRESSURE: 155 MMHG

## 2024-05-28 DIAGNOSIS — I10 BENIGN ESSENTIAL HYPERTENSION: Primary | ICD-10-CM

## 2024-05-28 DIAGNOSIS — R53.83 OTHER FATIGUE: ICD-10-CM

## 2024-05-28 DIAGNOSIS — R06.09 OTHER FORM OF DYSPNEA: ICD-10-CM

## 2024-05-28 PROCEDURE — 3080F DIAST BP >= 90 MM HG: CPT | Performed by: INTERNAL MEDICINE

## 2024-05-28 PROCEDURE — 99213 OFFICE O/P EST LOW 20 MIN: CPT | Performed by: INTERNAL MEDICINE

## 2024-05-28 PROCEDURE — 3077F SYST BP >= 140 MM HG: CPT | Performed by: INTERNAL MEDICINE

## 2024-05-28 NOTE — PROGRESS NOTES
Subjective   Patient ID: Guido Bell is a 78 y.o. male who presents for No chief complaint on file..    HPI follow-up visit and sick visit same day after hours no staff no chest pain no shortness of breath but when he walks even several feet he becomes exhausted perhaps not dyspnea perhaps not so much tired he was able to play 7 holes of golf uncertain whether he meant physically or mentally exhausted but when he walks he then often has to sit down until it takes about 10 minutes to get himself back together he has had some episodes similar to this in the past once he was taken off Flomax and it seemed to help for about 1 year once he was on Florinef and that seemed to help before no longer helping blood sugars have been okay    Review of Systems    Objective   There were no vitals taken for this visit.    Physical Exam vital signs noted alert and oriented x 3 NCAT no JVD or bruit chest clear to auscultation and percussion no wheezing no crackles CV regular rate and rhythm S1-S2 without murmur gallop or rub abdomen soft nontender normal active bowel sounds LS spine normal curvature negative straight leg raise extremities no clubbing cyanosis there is venous stasis edema similar to before intact distal pulses DTR 2+    Impression diabetes mellitus fatigue versus hypertension versus dyspnea other diagnoses  Plan will obtain chest x-ray if not done recently requisition to be made  Assessment/Plan reviewed prior lumbar spine films reviewed prior head CT reviewed prior stress test he has a follow-up visit with cardiology in the coming weeks review if there has been an echocardiogram good diet good hydration exercise as able continue to monitor blood sugars at home and recheck based on above

## 2024-05-29 ENCOUNTER — HOSPITAL ENCOUNTER (OUTPATIENT)
Dept: RADIOLOGY | Facility: CLINIC | Age: 79
Discharge: HOME | End: 2024-05-29
Payer: MEDICARE

## 2024-05-29 DIAGNOSIS — I10 BENIGN ESSENTIAL HYPERTENSION: ICD-10-CM

## 2024-05-29 DIAGNOSIS — R06.09 OTHER FORM OF DYSPNEA: ICD-10-CM

## 2024-05-29 PROCEDURE — 71046 X-RAY EXAM CHEST 2 VIEWS: CPT

## 2024-05-29 PROCEDURE — 71046 X-RAY EXAM CHEST 2 VIEWS: CPT | Performed by: RADIOLOGY

## 2024-06-04 ENCOUNTER — LAB (OUTPATIENT)
Dept: LAB | Facility: LAB | Age: 79
End: 2024-06-04
Payer: MEDICARE

## 2024-06-04 ENCOUNTER — OFFICE VISIT (OUTPATIENT)
Dept: NEUROLOGY | Facility: CLINIC | Age: 79
End: 2024-06-04
Payer: MEDICARE

## 2024-06-04 VITALS
HEIGHT: 67 IN | WEIGHT: 187 LBS | SYSTOLIC BLOOD PRESSURE: 123 MMHG | DIASTOLIC BLOOD PRESSURE: 82 MMHG | BODY MASS INDEX: 29.35 KG/M2 | HEART RATE: 93 BPM

## 2024-06-04 DIAGNOSIS — I95.1 ORTHOSTATIC HYPOTENSION: ICD-10-CM

## 2024-06-04 DIAGNOSIS — I95.1 ORTHOSTATIC HYPOTENSION: Primary | ICD-10-CM

## 2024-06-04 DIAGNOSIS — G47.52 REM SLEEP BEHAVIOR DISORDER: ICD-10-CM

## 2024-06-04 LAB
ANION GAP SERPL CALC-SCNC: 14 MMOL/L (ref 10–20)
BUN SERPL-MCNC: 21 MG/DL (ref 6–23)
CALCIUM SERPL-MCNC: 9.7 MG/DL (ref 8.6–10.6)
CHLORIDE SERPL-SCNC: 100 MMOL/L (ref 98–107)
CO2 SERPL-SCNC: 28 MMOL/L (ref 21–32)
CREAT SERPL-MCNC: 1.15 MG/DL (ref 0.5–1.3)
EGFRCR SERPLBLD CKD-EPI 2021: 65 ML/MIN/1.73M*2
ERYTHROCYTE [DISTWIDTH] IN BLOOD BY AUTOMATED COUNT: 13.1 % (ref 11.5–14.5)
GLUCOSE SERPL-MCNC: 151 MG/DL (ref 74–99)
HCT VFR BLD AUTO: 36.5 % (ref 41–52)
HGB BLD-MCNC: 11.6 G/DL (ref 13.5–17.5)
MCH RBC QN AUTO: 29.9 PG (ref 26–34)
MCHC RBC AUTO-ENTMCNC: 31.8 G/DL (ref 32–36)
MCV RBC AUTO: 94 FL (ref 80–100)
NRBC BLD-RTO: 0 /100 WBCS (ref 0–0)
PLATELET # BLD AUTO: 198 X10*3/UL (ref 150–450)
POTASSIUM SERPL-SCNC: 4.1 MMOL/L (ref 3.5–5.3)
RBC # BLD AUTO: 3.88 X10*6/UL (ref 4.5–5.9)
SODIUM SERPL-SCNC: 138 MMOL/L (ref 136–145)
VIT B12 SERPL-MCNC: 1043 PG/ML (ref 211–911)
WBC # BLD AUTO: 5.7 X10*3/UL (ref 4.4–11.3)

## 2024-06-04 PROCEDURE — 36415 COLL VENOUS BLD VENIPUNCTURE: CPT

## 2024-06-04 PROCEDURE — 82607 VITAMIN B-12: CPT

## 2024-06-04 PROCEDURE — 80048 BASIC METABOLIC PNL TOTAL CA: CPT

## 2024-06-04 PROCEDURE — 99215 OFFICE O/P EST HI 40 MIN: CPT | Performed by: PSYCHIATRY & NEUROLOGY

## 2024-06-04 PROCEDURE — 1160F RVW MEDS BY RX/DR IN RCRD: CPT | Performed by: PSYCHIATRY & NEUROLOGY

## 2024-06-04 PROCEDURE — 85027 COMPLETE CBC AUTOMATED: CPT

## 2024-06-04 PROCEDURE — 3079F DIAST BP 80-89 MM HG: CPT | Performed by: PSYCHIATRY & NEUROLOGY

## 2024-06-04 PROCEDURE — 3074F SYST BP LT 130 MM HG: CPT | Performed by: PSYCHIATRY & NEUROLOGY

## 2024-06-04 PROCEDURE — 1159F MED LIST DOCD IN RCRD: CPT | Performed by: PSYCHIATRY & NEUROLOGY

## 2024-06-04 RX ORDER — PYRIDOSTIGMINE BROMIDE 60 MG/1
30 TABLET ORAL 3 TIMES DAILY
Qty: 45 TABLET | Refills: 2 | Status: SHIPPED | OUTPATIENT
Start: 2024-06-04 | End: 2024-09-02

## 2024-06-04 RX ORDER — TALC
3 POWDER (GRAM) TOPICAL NIGHTLY
Qty: 30 TABLET | Refills: 2 | Status: SHIPPED | OUTPATIENT
Start: 2024-06-04

## 2024-06-04 NOTE — PROGRESS NOTES
Subjective     Guido Bell is a 78 y.o. year old male seen in follow-up for history of orthostatic hypotension; currently noting fatigue with prolonged walking/standing, intermittent tremor predominantly of right hand, thrashing in sleep.    HPI    78-year-old right-handed man, retired , with past medical history significant for hypertension, type 2 diabetes that his wife indicates was diagnosed around 8-9 years ago, atrial fibrillation on Eliquis, hyperlipidemia, BPH, osteoarthritis, hip replacements, cholecystectomy.     I evaluated him initially on 4/3/2023. As detailed in my ambulatory EMR note from that date he presented with lightheadedness tentatively attributed to autonomic neuropathy in the context of diabetes. His symptoms began about 1 year prior to that evaluation. He endorsed positional lightheadedness noted while standing and relieved with sitting or lying down.     On testing in the office he was orthostatic by about 40 points systolic and 15 points diastolic after 1 minute of standing. There was a 9 point rise in heart rate between sitting and standing. I reviewed that he was on medications that could be contributing to orthostatic hypotension including antihypertensive medication, but with the seated blood pressure of 152/88 I did not suggest stopping his blood pressure medications. I also reviewed that tamsulosin could be a contributor and I suggested he try holding it unless it was necessary for bladder emptying. I reviewed conservative measures for management of orthostatic symptoms including hydration and use of an abdominal binder.     He was on a fludrocortisone regimen of 0.1 mg a.m. and 0.05 mg p.m. and I did not change it.     I sent him for labs including B12, SIEP and potassium level which were notable for B12 of 298 for which I started him on 1000 mcg orally daily replacement.    I evaluated him subsequently and most recently on 7/25/2023.  He indicated marked improvement  since stopping tamsulosin.  In fact his symptoms had entirely resolved.  He had not tried the abdominal binder or made any other changes to his medication regimen.  He was no longer noting positional lightheadedness.  He continued on fludrocortisone 0.1 mg a.m. and 0.05 mg p.m.  He was however significantly hypertensive both seated and standing at that visit and I advised him to taper off fludrocortisone if tolerated.  I advised him to stop potassium chloride as well.    He was to return on an as-needed basis and returns again today accompanied by his wife.    He indicates that he remained asymptomatic from the standpoint of orthostatic symptoms until about 1 month ago.  At that point, without any obvious precipitating factor (such as constitutional illness, medication change, vaccination) symptoms returned but somewhat differently than previous.  Whereas previously he was noting lightheadedness, over the past month he has noted a sense of being exhausted/fatigued.  This is with prolonged standing or after walking, sometimes even just after walking 10 or 12 feet.  There is no lightheadedness or other dizziness per se.    Yesterday he played 9 holes of golf and had no symptoms.  However, the week prior he experienced the above-noted exhaustion after playing about 7 holes.    He denies chest pain or dyspnea.  He is however pending an echocardiogram.    When he experiences this feeling of exhaustion while standing or walking, he stops and bends forward with hands resting on his thighs, and then is typically able to resume activity after couple of minutes.  However, sometimes he has to sit.  His legs can start to feel rubbery if he remains standing.    He has not fallen or fainted.    The above symptoms can occur throughout the course of the day but tend to be most pronounced after he takes his morning pills.    He remains off tamsulosin.  He is also off fludrocortisone.  He denies any new medications of significance  and any change in the doses of his blood pressure medications.  There has been no recent dramatic change in his weight.    He is trying to stay well-hydrated.  This includes both water and Gatorade.    He denies headaches or neck pain.    He denies neuropathic foot symptoms.    Some other concerns raised by the patient and his wife include a postural tremor that is either entirely or predominantly right sided, noted while holding items in front of him.  Per his wife the same tremor can occur at rest.    His wife also indicates that for years he has had episodes of yelling, thrashing or even falling out of bed in the middle of the night in his sleep.  He denies recall of dream content.  He has not been tried on any treatment for this complaint.    He reports chronic constipation.  He endorses nocturia typically 4-5 times and some difficulty starting a stream at times.  He continues on finasteride.       Review of Systems    As per the history of present illness    Patient Active Problem List   Diagnosis    Bilateral leg and foot pain    BPH (benign prostatic hyperplasia)    Cervical osteoarthritis    Cervical pain    Constipation    Combined hyperlipidemia    Contusion of left arm    Costochondritis    COVID-19 virus infection    Diabetes mellitus (Multi)    Dyspnea    Exertional shortness of breath    Enlarged prostate without lower urinary tract symptoms (luts)    Follicular impetigo    Head injury    Knee pain    Lightheadedness    Lower back pain    Hematoma of leg    Lymphedema of leg    Mycotic toenails    Olecranon bursitis    Orchitis and epididymitis    Osteoarthritis of left hip    Generalized weakness    Other fatigue    Pain of left upper extremity    Paroxysmal atrial fibrillation (Multi)    Primary osteoarthritis of left hand    Primary osteoarthritis of right knee    Prostatism    Ptosis of both eyelids    Sinobronchitis    Tension headache    Syncope and collapse    Thyroiditis    Tremor     Hypertension    Venous insufficiency of left lower extremity    Depression with anxiety    Diabetic autonomic neuropathy (Multi)    Orthostatic hypotension    Shoulder pain    Weight loss    Adenomatous polyp of descending colon    Lumbar radiculopathy     Past Medical History:   Diagnosis Date    Elevated prostate specific antigen (PSA)     Elevated prostate specific antigen (PSA)    Other disorders of intestinal carbohydrate absorption     Other disorders of intestinal carbohydrate absorption    Personal history of other diseases of male genital organs     History of benign prostatic hypertrophy    Personal history of other diseases of the circulatory system     History of hypertension    Personal history of other diseases of the nervous system and sense organs 09/09/2018    History of tension headache    Personal history of other endocrine, nutritional and metabolic disease     History of diabetes mellitus    Personal history of other endocrine, nutritional and metabolic disease     History of hyperlipidemia    Rectal polyp 07/11/2014    Rectal polyp    Spondylosis without myelopathy or radiculopathy, cervical region 06/02/2018    Cervical osteoarthritis     Past Surgical History:   Procedure Laterality Date    TOTAL HIP ARTHROPLASTY  12/09/2013    Total Hip Replacement     Social History     Tobacco Use    Smoking status: Never    Smokeless tobacco: Never   Substance Use Topics    Alcohol use: Not on file     family history includes arteriosclerotic cardiovascular disease in his father.    Current Outpatient Medications:     atorvastatin (Lipitor) 10 mg tablet, Take 1 tablet (10 mg) by mouth once daily., Disp: 90 tablet, Rfl: 2    blood sugar diagnostic (Accu-Chek Tersea Plus test strp) strip, TEST AS DIRECTED, EVERY OTHER DAY AND AS NEEDED FOR LOW BLOOD SUGAR., Disp: , Rfl:     Clenpiq 10 mg-3.5 gram- 12 gram/175 mL solution, PLEASE SEE ATTACHED FOR DETAILED DIRECTIONS, Disp: , Rfl:     dilTIAZem ER (Tiadylt ER)  360 mg 24 hr capsule, Take 1 capsule (360 mg) by mouth once daily., Disp: 90 capsule, Rfl: 3    DULoxetine (Cymbalta) 30 mg DR capsule, TAKE 1 CAPSULE BY MOUTH EVERY DAY AT NIGHT, Disp: 90 capsule, Rfl: 1    Eliquis 5 mg tablet, TAKE 1 TABLET BY MOUTH TWICE A DAY, Disp: 180 tablet, Rfl: 3    finasteride (Proscar) 5 mg tablet, TAKE 1 TABLET BY MOUTH EVERY DAY, Disp: 90 tablet, Rfl: 1    FreeStyle Lite Strips strip, USE AS DIRECTED 1X PER DAY, Disp: 100 strip, Rfl: 3    FreeStyle Test strip, Use as directed 1x per  day, Disp: , Rfl:     losartan (Cozaar) 25 mg tablet, TAKE 1 TABLET BY MOUTH EVERY DAY, Disp: 90 tablet, Rfl: 3    metFORMIN (Glucophage) 1,000 mg tablet, TAKE 1 TABLET BY MOUTH TWICE A DAY, Disp: 180 tablet, Rfl: 0  No Known Allergies    Objective   Neurological Exam  Physical Exam    Physical Examination:    General: Alert man who was ambulatory without assistive devices.      Cardiovascular: He became symptomatic after about 90 seconds of standing on orthostatic testing, feeling the above-noted exhaustion without ayla presyncopal lightheadedness.    Mental Status: Clear sensorium without fluctuation.  Appropriate in conversation.  Fluent unremarkable speech without paraphasic errors or hesitancy.  No bradyphrenia.    Cranial Nerves:  Funduscopic exam was not well visualized bilaterally on nondilated exam.  Pupils were equal, round and reactive to light with no relative afferent pupillary defect.  Extraocular movements were intact and conjugate without nystagmus.  No ptosis.  Visual fields were full to confrontation tested binocularly.  Facial sensation was symmetric to pin.  Facial motor function was symmetrically intact.  No hypomimia.  Hearing was grossly intact.  No dysarthria or hypophonia.  Shoulder shrug was symmetric.  Tongue protrusion was midline.    Motor: Muscle bulk and tone were normal throughout. There was no pronator drift or asymmetry of finger taps. Confrontation strength was  symmetrically 5/5 throughout the upper and lower extremities.     Coordination: Finger to finger, heel to shin, and alternating hand movements were rapid and accurate without dysmetria. There was no postural or rest tremor, myoclonus or dystonic posturing.  Repetitive hand movements were symmetric and rapid without bradykinesia.    Station: Intact and stable.    Gait: Stable and unremarkable.  Symmetric arm swing.  No shuffling, freezing or festination.  No tremor brought out by gait testing.        Assessment/Plan     He returns having seen me last in July 2023, at which point he reported complete resolution of orthostatic symptoms with stopping tamsulosin.  Symptoms returned about 1 month ago and although he complains of exhaustion rather than lightheadedness brought on by standing or walking, he is again orthostatic this time by about 50 points systolic and a little over 10 points diastolic.    We discussed that the basis for the return of his symptoms is unclear.    I discussed that he is hypertensive to a significant degree in the seated position (171/93) and because of this I am reluctant to recommend drastic reductions in his blood pressure medication regimen, or to start him directly back on fludrocortisone or similar agent.    I recommended first trying pyridostigmine 30 mg 3 times daily (which I discussed may incidentally help with constipation), to continue aggressive hydration, and to have him start using the abdominal binder.  I again reviewed the rationale for the abdominal binder and he indicated he will obtain this.     If the above measures are inadequate then we could resume a low dose of fludrocortisone but his blood pressures will need to be monitored.    I recommended checking labs including CBC, BMP and B12 level.  The office will contact him with results.  His wife asked about whether severe anemia could cause similar symptoms and it could be a contributor but would not frankly account for  orthostasis.    He is pending an echocardiogram which I encouraged him to follow through with.    We discussed some other complaints that surfaced at today's evaluation, raising question of a synucleinopathy.  Specifically, in the context of a history of orthostatic hypotension he also has history suggestive of REM behavior disorder and (by report of him and his wife but not evident on exam today) a rest and postural emergent tremor predominantly involving the right upper extremity.  Although I discussed with him and his wife that these features could be manifestations of a synucleinopathy such as PD, in fact on exam he has no rigidity, bradykinesia/hypokinesia, or gait/postural abnormality.  I am not even able to appreciate a tremor on exam today.  Accordingly, although he may ultimately evolve to a condition such as PD, at this point the significance of this constellation cannot be determined.    For management of symptoms suggestive of REM behavior disorder I recommended starting with melatonin 3 mg nightly.  I advised that he contact me regarding his response and if it is inadequate then we can titrate by 3 mg increments.    I advised him to follow-up in the office in 3 months.

## 2024-06-04 NOTE — PATIENT INSTRUCTIONS
We discussed that I suspect orthostatic hypotension is playing a significant role in your symptoms, even though you are experiencing fatigue rather than lightheadedness per se.  I do agree with the recommendation for an echocardiogram.  I am sending you for a few blood tests.    Since your blood pressure is often high (including today in the seated position), I am reluctant to recommend reducing blood pressure medication doses until you clear this with your primary doctor.  We would also need to be cautious about starting you on orthostatic hypotension medications that drive up the blood pressure.    Accordingly I recommend, in addition to continued attention to staying well-hydrated, that you start wearing an abdominal binder (you can obtain this at the [employer], medical supply store or online) whenever you are upright.  I also recommend starting pyridostigmine half a tablet 3 times a day.  This is a medication that helps some people with orthostatic hypotension and does not drive up the blood pressure dangerously.  If you have any trouble tolerating pyridostigmine, contact my office.    We discussed that you have episodes of thrashing in your sleep and even falling out of bed and this suggests REM behavior disorder.  The first-line of treatment is melatonin and I am prescribing a 3 mg dose at bedtime to start with.  If the episodes continue despite this dose, contact my office and we will increase it.    You have noted an intermittent tremor but I do not find any solid evidence of parkinsonism on your exam today.    Please see me in the office in 3 months.

## 2024-06-05 ENCOUNTER — TELEPHONE (OUTPATIENT)
Dept: PRIMARY CARE | Facility: CLINIC | Age: 79
End: 2024-06-05

## 2024-06-05 ENCOUNTER — ANCILLARY PROCEDURE (OUTPATIENT)
Dept: CARDIOLOGY | Facility: CLINIC | Age: 79
End: 2024-06-05
Payer: MEDICARE

## 2024-06-05 ENCOUNTER — TELEPHONE (OUTPATIENT)
Dept: NEUROLOGY | Facility: CLINIC | Age: 79
End: 2024-06-05
Payer: MEDICARE

## 2024-06-05 DIAGNOSIS — R06.09 OTHER FORM OF DYSPNEA: ICD-10-CM

## 2024-06-05 DIAGNOSIS — I10 BENIGN ESSENTIAL HYPERTENSION: ICD-10-CM

## 2024-06-05 DIAGNOSIS — R06.00 DYSPNEA, UNSPECIFIED: ICD-10-CM

## 2024-06-05 LAB
AORTIC VALVE MEAN GRADIENT: 3.5 MMHG
AORTIC VALVE PEAK VELOCITY: 1.16 M/S
AV PEAK GRADIENT: 5.4 MMHG
AVA (PEAK VEL): 4.92 CM2
AVA (VTI): 5.29 CM2
EJECTION FRACTION APICAL 4 CHAMBER: 62
LEFT ATRIUM VOLUME AREA LENGTH INDEX BSA: 30.2 ML/M2
LEFT VENTRICLE INTERNAL DIMENSION DIASTOLE: 4.16 CM (ref 3.5–6)
LEFT VENTRICULAR OUTFLOW TRACT DIAMETER: 2.65 CM
LV EJECTION FRACTION BIPLANE: 61 %
MITRAL VALVE E/A RATIO: 0.46
MITRAL VALVE E/E' RATIO: 14.84
TRICUSPID ANNULAR PLANE SYSTOLIC EXCURSION: 2.1 CM

## 2024-06-05 PROCEDURE — 93306 TTE W/DOPPLER COMPLETE: CPT | Performed by: INTERNAL MEDICINE

## 2024-06-05 PROCEDURE — 93306 TTE W/DOPPLER COMPLETE: CPT

## 2024-06-05 NOTE — TELEPHONE ENCOUNTER
----- Message from Garry Guzman MD sent at 6/5/2024  1:23 PM EDT -----  His labs show mild anemia which he should discuss with his primary doctor.  B12 level is good.  Metabolic panel is normal except for elevated glucose as was seen on his prior metabolic panels.  ----- Message -----  From: Mark Roth  Sent: 6/5/2024  11:44 AM EDT  To: Garry Guzman MD    Patient called about lab results completed yesterday

## 2024-06-07 ENCOUNTER — OFFICE VISIT (OUTPATIENT)
Dept: PRIMARY CARE | Facility: CLINIC | Age: 79
End: 2024-06-07
Payer: MEDICARE

## 2024-06-07 VITALS — SYSTOLIC BLOOD PRESSURE: 122 MMHG | DIASTOLIC BLOOD PRESSURE: 72 MMHG

## 2024-06-07 DIAGNOSIS — E11.43 DIABETIC AUTONOMIC NEUROPATHY ASSOCIATED WITH TYPE 2 DIABETES MELLITUS (MULTI): ICD-10-CM

## 2024-06-07 DIAGNOSIS — I34.81 MITRAL ANNULAR CALCIFICATION: Primary | ICD-10-CM

## 2024-06-07 PROCEDURE — 3074F SYST BP LT 130 MM HG: CPT | Performed by: INTERNAL MEDICINE

## 2024-06-07 PROCEDURE — 99213 OFFICE O/P EST LOW 20 MIN: CPT | Performed by: INTERNAL MEDICINE

## 2024-06-07 PROCEDURE — 3078F DIAST BP <80 MM HG: CPT | Performed by: INTERNAL MEDICINE

## 2024-06-07 NOTE — PROGRESS NOTES
Subjective   Patient ID: Guido Bell is a 78 y.o. male who presents for No chief complaint on file..    HPI follow-up visit no chest pain no shortness reviewed his recent blood work chest x-ray echocardiogram visit with the neurologist the initiation of pyridostigmine that he has not taken yet his symptoms he was able to play 9 holes of golf without it happening walked to the baseball game from the rapid without any trouble until on the way back took about 5 minutes and then was okay    Review of Systems    Objective   There were no vitals taken for this visit.    Physical Exam vital signs noted alert and oriented x 3 NCAT no JVD or bruit chest clear to auscultation CV regular rate and rhythm S1-S2 without murmur gallop or rub change extremities no clubbing cyanosis or edema normal distal pulses    Assessment/Plan    impression mitral annular calcification orthostasis diabetes  Plan reviewed his blood count and vitamin B12 levels stocking hose or abdominal binder the appropriate dose take mainly from the neurologist which she will begin the upcoming appointment with the cardiologist to stay well-hydrated comments on the valve continue with other medications check on bs regularly avoid hypoglycemia recheck based on above    Review (check)

## 2024-06-19 ENCOUNTER — OFFICE VISIT (OUTPATIENT)
Dept: CARDIOLOGY | Facility: HOSPITAL | Age: 79
End: 2024-06-19
Payer: MEDICARE

## 2024-06-19 VITALS
RESPIRATION RATE: 18 BRPM | WEIGHT: 185 LBS | DIASTOLIC BLOOD PRESSURE: 108 MMHG | OXYGEN SATURATION: 97 % | BODY MASS INDEX: 29.03 KG/M2 | SYSTOLIC BLOOD PRESSURE: 180 MMHG | HEIGHT: 67 IN | HEART RATE: 73 BPM

## 2024-06-19 DIAGNOSIS — I10 PRIMARY HYPERTENSION: ICD-10-CM

## 2024-06-19 DIAGNOSIS — T73.3XXA FATIGUE DUE TO EXCESSIVE EXERTION, INITIAL ENCOUNTER: Primary | ICD-10-CM

## 2024-06-19 DIAGNOSIS — I71.21 ANEURYSM OF ASCENDING AORTA WITHOUT RUPTURE (CMS-HCC): ICD-10-CM

## 2024-06-19 DIAGNOSIS — I48.0 PAROXYSMAL ATRIAL FIBRILLATION (MULTI): ICD-10-CM

## 2024-06-19 PROCEDURE — 3077F SYST BP >= 140 MM HG: CPT | Performed by: INTERNAL MEDICINE

## 2024-06-19 PROCEDURE — 99214 OFFICE O/P EST MOD 30 MIN: CPT | Performed by: INTERNAL MEDICINE

## 2024-06-19 PROCEDURE — 3080F DIAST BP >= 90 MM HG: CPT | Performed by: INTERNAL MEDICINE

## 2024-06-19 PROCEDURE — G2211 COMPLEX E/M VISIT ADD ON: HCPCS | Performed by: INTERNAL MEDICINE

## 2024-06-19 PROCEDURE — 1126F AMNT PAIN NOTED NONE PRSNT: CPT | Performed by: INTERNAL MEDICINE

## 2024-06-19 PROCEDURE — 1159F MED LIST DOCD IN RCRD: CPT | Performed by: INTERNAL MEDICINE

## 2024-06-19 PROCEDURE — 1036F TOBACCO NON-USER: CPT | Performed by: INTERNAL MEDICINE

## 2024-06-19 RX ORDER — AMLODIPINE BESYLATE 10 MG/1
10 TABLET ORAL
COMMUNITY

## 2024-06-19 ASSESSMENT — ENCOUNTER SYMPTOMS
LOSS OF SENSATION IN FEET: 0
DEPRESSION: 0
OCCASIONAL FEELINGS OF UNSTEADINESS: 0

## 2024-06-19 ASSESSMENT — PAIN SCALES - GENERAL: PAINLEVEL: 0-NO PAIN

## 2024-06-19 NOTE — PROGRESS NOTES
"Chief Complaint:   Atrial Fibrillation, Hypertension, and Follow-up (6 month. Patient c/o episodes of fatigue and SOB with exertion)     History Of Present Illness:    Guido Bell is a 78 y.o. male here for followup. After an episode of persistent palpitations with malaise 5/2018 he was diagnosed with atrial fibrillation. Spontaneously converted to NSR. Had rate control with diltiazem gtt which was ultimately converted to oral. Was started on Eliquis for AC.      Reports episodes of exhaustion after physical exertion particularly with hills or with carrying equipment but can have an episode with walking from his couch to the kitchen. Episodes do not occur daily and has not had an episode in 3 days despite moderate physical activity. Denies any lightheadedness or chest discomfort. Denies any alerts from his Smartwatch. Reports relatively well controlled BP's at home being typically in the 130's.           Last Recorded Vitals:  Vitals:    06/19/24 1112 06/19/24 1113   BP: (!) 191/97 (!) 180/108   BP Location: Left arm    Patient Position: Sitting    BP Cuff Size: Large adult    Pulse: 73    Resp: 18    SpO2: 97%    Weight: 83.9 kg (185 lb)    Height: 1.702 m (5' 7\")                Allergies:  Patient has no known allergies.    Outpatient Medications:  Current Outpatient Medications   Medication Instructions    amLODIPine (NORVASC) 10 mg, oral, Daily RT    atorvastatin (LIPITOR) 10 mg, oral, Daily    blood sugar diagnostic (Accu-Chek Teresa Plus test strp) strip TEST AS DIRECTED, EVERY OTHER DAY AND AS NEEDED FOR LOW BLOOD SUGAR.    dilTIAZem ER (TIADYLT ER) 360 mg, oral, Daily    DULoxetine (CYMBALTA) 30 mg, oral, Nightly    Eliquis 5 mg, oral, 2 times daily    finasteride (PROSCAR) 5 mg, oral, Daily    FreeStyle Lite Strips strip USE AS DIRECTED 1X PER DAY    losartan (COZAAR) 25 mg, oral, Daily    melatonin 3 mg, oral, Nightly    metFORMIN (GLUCOPHAGE) 1,000 mg, oral, 2 times daily    pyridostigmine (MESTINON) 30 " mg, oral, 3 times daily         Physical Exam:  Gen Well appearing late septuagenarian male sitting up in NAD. Body mass index is 28.98 kg/m².   CV rrr. No m/r/g appreciated. No JVD or leg edema.    Pulm Lungs clear with normal respiratory effort.  Neuro Alert and conversant. Grossly nonfocal.       I reviewed the patient's ECG -  NSR    I reviewed most recent imaging / labs / and office notes.    I reviewed the patient's echocardiogram (6/2024) images and report.     Assessment/Plan   1. Exertional fatigue  Etiology unclear. May represent an anginal equivalent. Will send for exercise stress testing and will consider an event monitor if no significant findings therein.    2. Atrial fibrillation  Paroxysmal. No recent episodes. He has been symptomatic with recurrences in the past despite evidence of rate control. We had discussed rhythm control options and he has declined citing overall tolerance for recurrences. He was previously cautioned that in the future his episodes may be longer lasting and may not be tolerable and that rhythm control could be initiated should that occur. He is on Eliquis for AC and despite his prior bleeding issues and appears to be tolerating it well. Monitoring.      3. HTN  BP uncontrolled today though seemingly an outlier relative to recent office and home checks. His present regimen is to continue. It will be assessed further with stress testing as below.     4. Aortic aneurysm  Mild. For yearly repeat echocardiograms (next 6/2025). On statin therapy. BP management as above.          Followup 8 weeks.        Luis Fonseca MD

## 2024-06-27 DIAGNOSIS — E11.9 TYPE 2 DIABETES MELLITUS WITHOUT COMPLICATION, UNSPECIFIED WHETHER LONG TERM INSULIN USE (MULTI): ICD-10-CM

## 2024-06-30 RX ORDER — METFORMIN HYDROCHLORIDE 1000 MG/1
1000 TABLET ORAL 2 TIMES DAILY
Qty: 180 TABLET | Refills: 0 | Status: SHIPPED | OUTPATIENT
Start: 2024-06-30

## 2024-07-01 DIAGNOSIS — Z00.00 ENCOUNTER FOR GENERAL ADULT MEDICAL EXAMINATION WITHOUT ABNORMAL FINDINGS: ICD-10-CM

## 2024-07-03 ENCOUNTER — HOSPITAL ENCOUNTER (OUTPATIENT)
Dept: CARDIOLOGY | Facility: HOSPITAL | Age: 79
Discharge: HOME | End: 2024-07-03
Payer: MEDICARE

## 2024-07-03 DIAGNOSIS — T73.3XXA FATIGUE DUE TO EXCESSIVE EXERTION, INITIAL ENCOUNTER: ICD-10-CM

## 2024-07-03 DIAGNOSIS — R06.00 DYSPNEA, UNSPECIFIED: ICD-10-CM

## 2024-07-03 PROCEDURE — 93018 CV STRESS TEST I&R ONLY: CPT | Performed by: INTERNAL MEDICINE

## 2024-07-03 PROCEDURE — 93016 CV STRESS TEST SUPVJ ONLY: CPT | Performed by: INTERNAL MEDICINE

## 2024-07-03 PROCEDURE — 93017 CV STRESS TEST TRACING ONLY: CPT

## 2024-07-05 RX ORDER — DULOXETIN HYDROCHLORIDE 30 MG/1
30 CAPSULE, DELAYED RELEASE ORAL NIGHTLY
Qty: 90 CAPSULE | Refills: 1 | Status: SHIPPED | OUTPATIENT
Start: 2024-07-05

## 2024-07-24 ENCOUNTER — TELEPHONE (OUTPATIENT)
Dept: NEUROLOGY | Facility: CLINIC | Age: 79
End: 2024-07-24
Payer: MEDICARE

## 2024-07-24 NOTE — TELEPHONE ENCOUNTER
----- Message from Garry Guzman sent at 7/23/2024  4:07 PM EDT -----  Please inform him I recommend he stop it.  ----- Message -----  From: Mark Roth  Sent: 7/23/2024   3:35 PM EDT  To: Garry Guzman MD    Patient called wanted to discuss medication pyridostigmine (Mestinon) 60 mg tablet- Does not think its working believe its causing diarrhea

## 2024-08-23 DIAGNOSIS — G47.52 REM SLEEP BEHAVIOR DISORDER: ICD-10-CM

## 2024-08-23 RX ORDER — TALC
3 POWDER (GRAM) TOPICAL NIGHTLY
Qty: 30 TABLET | Refills: 2 | Status: SHIPPED | OUTPATIENT
Start: 2024-08-23

## 2024-08-28 ENCOUNTER — OFFICE VISIT (OUTPATIENT)
Dept: CARDIOLOGY | Facility: HOSPITAL | Age: 79
End: 2024-08-28
Payer: MEDICARE

## 2024-08-28 ENCOUNTER — HOSPITAL ENCOUNTER (OUTPATIENT)
Dept: CARDIOLOGY | Facility: HOSPITAL | Age: 79
Discharge: HOME | End: 2024-08-28
Payer: MEDICARE

## 2024-08-28 VITALS
RESPIRATION RATE: 18 BRPM | BODY MASS INDEX: 29.82 KG/M2 | SYSTOLIC BLOOD PRESSURE: 138 MMHG | DIASTOLIC BLOOD PRESSURE: 90 MMHG | OXYGEN SATURATION: 95 % | HEART RATE: 87 BPM | HEIGHT: 67 IN | WEIGHT: 190 LBS

## 2024-08-28 DIAGNOSIS — I48.0 PAROXYSMAL ATRIAL FIBRILLATION (MULTI): ICD-10-CM

## 2024-08-28 DIAGNOSIS — R68.89 EXERCISE INTOLERANCE: ICD-10-CM

## 2024-08-28 DIAGNOSIS — R68.89 EXERCISE INTOLERANCE: Primary | Chronic | ICD-10-CM

## 2024-08-28 DIAGNOSIS — I71.21 ANEURYSM OF ASCENDING AORTA WITHOUT RUPTURE (CMS-HCC): ICD-10-CM

## 2024-08-28 DIAGNOSIS — I10 PRIMARY HYPERTENSION: ICD-10-CM

## 2024-08-28 LAB — BODY SURFACE AREA: 2.02 M2

## 2024-08-28 PROCEDURE — 93005 ELECTROCARDIOGRAM TRACING: CPT | Performed by: INTERNAL MEDICINE

## 2024-08-28 PROCEDURE — 93010 ELECTROCARDIOGRAM REPORT: CPT | Performed by: INTERNAL MEDICINE

## 2024-08-28 PROCEDURE — 93246 EXT ECG>7D<15D RECORDING: CPT

## 2024-08-28 PROCEDURE — 99214 OFFICE O/P EST MOD 30 MIN: CPT | Mod: 25 | Performed by: INTERNAL MEDICINE

## 2024-08-28 PROCEDURE — 3080F DIAST BP >= 90 MM HG: CPT | Performed by: INTERNAL MEDICINE

## 2024-08-28 PROCEDURE — 3075F SYST BP GE 130 - 139MM HG: CPT | Performed by: INTERNAL MEDICINE

## 2024-08-28 PROCEDURE — G2211 COMPLEX E/M VISIT ADD ON: HCPCS | Performed by: INTERNAL MEDICINE

## 2024-08-28 PROCEDURE — 1159F MED LIST DOCD IN RCRD: CPT | Performed by: INTERNAL MEDICINE

## 2024-08-28 PROCEDURE — 1036F TOBACCO NON-USER: CPT | Performed by: INTERNAL MEDICINE

## 2024-08-28 PROCEDURE — 99214 OFFICE O/P EST MOD 30 MIN: CPT | Performed by: INTERNAL MEDICINE

## 2024-08-28 ASSESSMENT — ENCOUNTER SYMPTOMS: DEPRESSION: 0

## 2024-08-28 NOTE — PROGRESS NOTES
"Chief Complaint:   Follow-up and Atrial Fibrillation     History Of Present Illness:    Guido Bell is a 78 y.o. male here for followup. After an episode of persistent palpitations with malaise 5/2018 he was diagnosed with atrial fibrillation. Spontaneously converted to NSR. Had rate control with diltiazem gtt which was ultimately converted to oral. Was started on Eliquis for AC.      He reports doing well overall though still has episodes of intermittent exhaustion after physical exertion particularly with hills or with carrying equipment though he has not had an episode in about a couple of weeks or more. Denies any lightheadedness or chest discomfort. Denies any alerts from his Smartwatch. Reports variable control of his BP at home with considerable orthostatic intolerance with which he is being evaluated by neurology for (has been intolerant to pyridostigmine and has yet to try an abdominal binder).           Last Recorded Vitals:  Vitals:    08/28/24 0914 08/28/24 0916 08/28/24 0922   BP: (!) 182/106 (!) 177/101 138/90   BP Location: Left arm Left arm Right arm   Patient Position: Lying Sitting Standing   BP Cuff Size:  Adult Adult   Pulse:  87    Resp:  18    SpO2:  95%    Weight:  86.2 kg (190 lb)    Height:  1.702 m (5' 7\")                  Allergies:  Patient has no known allergies.    Outpatient Medications:  Current Outpatient Medications   Medication Instructions    amLODIPine (NORVASC) 10 mg, oral, Daily RT    atorvastatin (LIPITOR) 10 mg, oral, Daily    blood sugar diagnostic (Accu-Chek Teresa Plus test strp) strip TEST AS DIRECTED, EVERY OTHER DAY AND AS NEEDED FOR LOW BLOOD SUGAR.    dilTIAZem ER (TIADYLT ER) 360 mg, oral, Daily    DULoxetine (CYMBALTA) 30 mg, oral, Nightly    Eliquis 5 mg, oral, 2 times daily    finasteride (PROSCAR) 5 mg, oral, Daily    FreeStyle Lite Strips strip USE AS DIRECTED 1X PER DAY    losartan (COZAAR) 25 mg, oral, Daily    melatonin 3 mg, oral, Nightly    metFORMIN " (GLUCOPHAGE) 1,000 mg, oral, 2 times daily    pyridostigmine (MESTINON) 30 mg, oral, 3 times daily         Physical Exam:  Gen Well appearing late septuagenarian male sitting up in NAD. Body mass index is 29.76 kg/m².   CV rrr. No m/r/g appreciated. No JVD or leg edema.    Pulm Lungs clear with normal respiratory effort.  Neuro Alert and conversant. Grossly nonfocal.       I reviewed the patient's ECG -  NSR. First degree AV block.      I reviewed most recent imaging / labs / and office notes.    I reviewed the patient's echocardiogram (6/2024) images and report.     Assessment/Plan   1. Exertional fatigue / exercise intolerance  Etiology unclear. Stress testing was unremarkable and did not reproduce his symptoms. Could represent a tachy or alejandrina arrhythmia. Will assess with a two week monitor.    2. Atrial fibrillation  Paroxysmal. No recent episodes. He has been symptomatic with recurrences in the past despite evidence of rate control. We had discussed rhythm control options and he has declined citing overall tolerance for recurrences. He was previously cautioned that in the future his episodes may be longer lasting and may not be tolerable and that rhythm control could be initiated should that occur. He is on Eliquis for AC and despite his prior bleeding issues and appears to be tolerating it well. Monitoring.      3. Hypertension   BP uncontrolled today has a known degree of white coat hypertension with better pressures at home. Also has a significant degree of orthostatic intolerance making BP management difficult. His present regimen is to continue.     4. Aortic aneurysm  Mild. For yearly repeat echocardiograms (next 6/2025). On statin therapy. BP management as above.        Followup 8 weeks.        Luis Fonseca MD

## 2024-08-30 LAB
ATRIAL RATE: 87 BPM
P AXIS: 53 DEGREES
P OFFSET: 191 MS
P ONSET: 123 MS
PR INTERVAL: 210 MS
Q ONSET: 228 MS
QRS COUNT: 15 BEATS
QRS DURATION: 84 MS
QT INTERVAL: 376 MS
QTC CALCULATION(BAZETT): 452 MS
QTC FREDERICIA: 425 MS
R AXIS: 9 DEGREES
T AXIS: 74 DEGREES
T OFFSET: 416 MS
VENTRICULAR RATE: 87 BPM

## 2024-09-13 DIAGNOSIS — E78.2 MIXED HYPERLIPIDEMIA: ICD-10-CM

## 2024-09-14 DIAGNOSIS — I10 HYPERTENSION, UNSPECIFIED TYPE: ICD-10-CM

## 2024-09-14 RX ORDER — ATORVASTATIN CALCIUM 10 MG/1
10 TABLET, FILM COATED ORAL DAILY
Qty: 90 TABLET | Refills: 2 | Status: SHIPPED | OUTPATIENT
Start: 2024-09-14

## 2024-09-17 ENCOUNTER — APPOINTMENT (OUTPATIENT)
Dept: NEUROLOGY | Facility: CLINIC | Age: 79
End: 2024-09-17
Payer: MEDICARE

## 2024-09-17 VITALS
BODY MASS INDEX: 29.76 KG/M2 | SYSTOLIC BLOOD PRESSURE: 128 MMHG | DIASTOLIC BLOOD PRESSURE: 86 MMHG | HEIGHT: 67 IN | HEART RATE: 89 BPM

## 2024-09-17 DIAGNOSIS — I95.1 ORTHOSTATIC HYPOTENSION: Primary | ICD-10-CM

## 2024-09-17 DIAGNOSIS — G47.52 REM SLEEP BEHAVIOR DISORDER: ICD-10-CM

## 2024-09-17 PROCEDURE — 1036F TOBACCO NON-USER: CPT | Performed by: PSYCHIATRY & NEUROLOGY

## 2024-09-17 PROCEDURE — 3074F SYST BP LT 130 MM HG: CPT | Performed by: PSYCHIATRY & NEUROLOGY

## 2024-09-17 PROCEDURE — 1160F RVW MEDS BY RX/DR IN RCRD: CPT | Performed by: PSYCHIATRY & NEUROLOGY

## 2024-09-17 PROCEDURE — 1159F MED LIST DOCD IN RCRD: CPT | Performed by: PSYCHIATRY & NEUROLOGY

## 2024-09-17 PROCEDURE — 99214 OFFICE O/P EST MOD 30 MIN: CPT | Performed by: PSYCHIATRY & NEUROLOGY

## 2024-09-17 PROCEDURE — 3079F DIAST BP 80-89 MM HG: CPT | Performed by: PSYCHIATRY & NEUROLOGY

## 2024-09-17 RX ORDER — DILTIAZEM HYDROCHLORIDE 360 MG/1
360 CAPSULE, EXTENDED RELEASE ORAL DAILY
Qty: 90 CAPSULE | Refills: 3 | Status: SHIPPED | OUTPATIENT
Start: 2024-09-17 | End: 2025-09-17

## 2024-09-17 NOTE — PATIENT INSTRUCTIONS
We discussed that you are still symptomatic of orthostatic hypotension, and your systolic blood pressure drops by approximately 40 points after 1 minute of standing in the office today.    You had GI side effects with pyridostigmine.    We discussed that there are other medication options for orthostatic hypotension but that most of them carry the potential downside of overshoot hypertension.  As your blood pressure is already labile with significant elevation in the seated position, I am reluctant to start you on such medication at this time.  Additionally, it would work at cross purposes to your losartan and diltiazem.    As such, we discussed nonpharmacologic management.  Anytime you anticipate being on your feet for a long period (such as on the golf course), I recommend you put on the abdominal binder in the morning.  Apply the binder snugly.  I also recommend, as we discussed, that you be more conscientious about hydration.  Try 8 ounces of Gatorade or similar electrolyte drink in the morning before you anticipate any significant period of time on your feet.  Have a few additional 8 ounce glasses of water or throughout the course of the day.    Since melatonin at the present dose of 3 mg seems to be reasonably controlling symptoms of REM behavior disorder I recommend you continue the present regimen.  If you start having more episodes of acting out dreams, increase the dose to 2 pills of melatonin at bedtime.    Please see me in the office in 9 months.   Bactrim Counseling:  I discussed with the patient the risks of sulfa antibiotics including but not limited to GI upset, allergic reaction, drug rash, diarrhea, dizziness, photosensitivity, and yeast infections.  Rarely, more serious reactions can occur including but not limited to aplastic anemia, agranulocytosis, methemoglobinemia, blood dyscrasias, liver or kidney failure, lung infiltrates or desquamative/blistering drug rashes.

## 2024-09-17 NOTE — PROGRESS NOTES
Subjective     Guido Bell is a 78 y.o. year old male seen in follow-up for orthostatic hypotension, suspected REM behavior disorder.    HPI    78-year-old right-handed man, retired , with past medical history significant for hypertension, type 2 diabetes that his wife indicates was diagnosed around 8-9 years ago, atrial fibrillation on Eliquis, hyperlipidemia, BPH, osteoarthritis, hip replacements, cholecystectomy.     I evaluated him initially on 4/3/2023. As detailed in my ambulatory EMR note from that date he presented with lightheadedness tentatively attributed to autonomic neuropathy in the context of diabetes. His symptoms began about 1 year prior to that evaluation. He endorsed positional lightheadedness noted while standing and relieved with sitting or lying down.     On testing in the office he was orthostatic by about 40 points systolic and 15 points diastolic after 1 minute of standing. There was a 9 point rise in heart rate between sitting and standing. I reviewed that he was on medications that could be contributing to orthostatic hypotension including antihypertensive medication, but with the seated blood pressure of 152/88 I did not suggest stopping his blood pressure medications. I also reviewed that tamsulosin could be a contributor and I suggested he try holding it unless it was necessary for bladder emptying. I reviewed conservative measures for management of orthostatic symptoms including hydration and use of an abdominal binder.     He was on a fludrocortisone regimen of 0.1 mg a.m. and 0.05 mg p.m. and I did not change it.     I sent him for labs including B12, SIEP and potassium level which were notable for B12 of 298 for which I started him on 1000 mcg orally daily replacement.     I evaluated him subsequently on 7/25/2023.  He indicated marked improvement since stopping tamsulosin.  In fact his symptoms had entirely resolved.  He had not tried the abdominal binder or made any  "other changes to his medication regimen.  He was no longer noting positional lightheadedness.  He continued on fludrocortisone 0.1 mg a.m. and 0.05 mg p.m.  He was however significantly hypertensive both seated and standing at that visit and I advised him to taper off fludrocortisone if tolerated.  I advised him to stop potassium chloride as well.  He was to return on an as-needed basis.    I evaluated him subsequently and most recently on 6//24.  As detailed in my note from that date he had been asymptomatic from the standpoint of orthostatic hypotension until about 1 month prior to that visit.  Symptoms returned manifest as exhaustion rather than lightheadedness, occurring with standing or walking and resolving with sitting.  He was orthostatic in the office by about 50 points systolic and a little over 10 points diastolic.  He was however hypertensive to 171/93 in the seated position and I did not recommend resumption of fludrocortisone or initiation of midodrine.  Instead I recommended pyridostigmine, aggressive hydration and use of an abdominal binder.    Additional complaints at the last visit included episodes of thrashing and sleep and even falling out of bed historically, raising question of REM behavior disorder.  I suggested melatonin 3 mg nightly.  Additionally, he and his wife noted him to intermittently exhibit a tremor of either hand but I did not see tremor nor any parkinsonian features on exam.    He is evaluated again today in the office, again accompanied by his wife.    He has continued to have episodes of \"total exhaustion\" provoked most often by golfing (by about the 14th hole) but also by long walks, walking uphill, or standing or walking with something heavy in his arms.  These episodes do not occur when he is seated.  They are not specifically associated with presyncopal lightheadedness or any other dizziness, but he does feel compelled to sit down and then feels exhausted for the rest of " the day when he has such an episode.    The above episodes occur not on a daily or even weekly basis per his wife but occasionally.    He has not been falling.  He notes no gait change.    He was intolerant of pyridostigmine due to GI side effects and is no longer taking it.    He is not good about hydration according to his wife.  He may take a sip of Gatorade in the morning but does not have an entire glass of it nor is he aggressive about hydration in general.  He does not add salt but does not specifically restrict it.    He has not yet tried wearing the abdominal binder, he indicates because he was wearing a heart monitor.    He denies headaches.  He denies vision complaints.    He denies neuropathic foot symptoms such as stabbing/burning/shocklike pain, numbness or tingling.    He has undergone recent cardiac evaluation including a stress test that was essentially negative although did note labile blood pressure, and an echocardiogram again without notable findings.    He continues on losartan and diltiazem.    He is episodes of acting out dream content have markedly diminished with the addition of melatonin 3 mg nightly.  He has not fallen out of bed again.    He and his wife continue to observe that he has an intermittent tremor of the right or left hand.  It sounds as if sometimes it is perceived as an emergent postural phenomenon and sometimes a resting phenomenon.      Review of Systems    As per the history of present illness    Patient Active Problem List   Diagnosis    Bilateral leg and foot pain    BPH (benign prostatic hyperplasia)    Cervical osteoarthritis    Cervical pain    Constipation    Combined hyperlipidemia    Contusion of left arm    Costochondritis    COVID-19 virus infection    Diabetes mellitus (Multi)    Dyspnea    Exertional shortness of breath    Enlarged prostate without lower urinary tract symptoms (luts)    Follicular impetigo    Head injury    Knee pain    Lightheadedness     Lower back pain    Hematoma of leg    Lymphedema of leg    Mycotic toenails    Olecranon bursitis    Orchitis and epididymitis    Osteoarthritis of left hip    Generalized weakness    Fatigue due to excessive exertion    Pain of left upper extremity    Paroxysmal atrial fibrillation (Multi)    Primary osteoarthritis of left hand    Primary osteoarthritis of right knee    Prostatism    Ptosis of both eyelids    Sinobronchitis    Tension headache    Syncope and collapse    Thyroiditis    Tremor    Hypertension    Venous insufficiency of left lower extremity    Depression with anxiety    Diabetic autonomic neuropathy (Multi)    Orthostatic hypotension    Shoulder pain    Weight loss    Adenomatous polyp of descending colon    Lumbar radiculopathy    Aneurysm of ascending aorta without rupture (CMS-HCC)    Exercise intolerance     Past Medical History:   Diagnosis Date    Elevated prostate specific antigen (PSA)     Elevated prostate specific antigen (PSA)    Other disorders of intestinal carbohydrate absorption     Other disorders of intestinal carbohydrate absorption    Personal history of other diseases of male genital organs     History of benign prostatic hypertrophy    Personal history of other diseases of the circulatory system     History of hypertension    Personal history of other diseases of the nervous system and sense organs 09/09/2018    History of tension headache    Personal history of other endocrine, nutritional and metabolic disease     History of diabetes mellitus    Personal history of other endocrine, nutritional and metabolic disease     History of hyperlipidemia    Rectal polyp 07/11/2014    Rectal polyp    Spondylosis without myelopathy or radiculopathy, cervical region 06/02/2018    Cervical osteoarthritis     Past Surgical History:   Procedure Laterality Date    TOTAL HIP ARTHROPLASTY  12/09/2013    Total Hip Replacement     Social History     Tobacco Use    Smoking status: Never    Smokeless  tobacco: Never   Substance Use Topics    Alcohol use: Never     family history includes arteriosclerotic cardiovascular disease in his father.    Current Outpatient Medications:     amLODIPine (Norvasc) 10 mg tablet, Take 1 tablet (10 mg) by mouth once daily., Disp: , Rfl:     atorvastatin (Lipitor) 10 mg tablet, TAKE 1 TABLET BY MOUTH EVERY DAY, Disp: 90 tablet, Rfl: 2    blood sugar diagnostic (Accu-Chek Teresa Plus test strp) strip, TEST AS DIRECTED, EVERY OTHER DAY AND AS NEEDED FOR LOW BLOOD SUGAR., Disp: , Rfl:     dilTIAZem ER (Tiadylt ER) 360 mg 24 hr capsule, Take 1 capsule (360 mg) by mouth once daily., Disp: 90 capsule, Rfl: 3    DULoxetine (Cymbalta) 30 mg DR capsule, TAKE 1 CAPSULE BY MOUTH EVERY NIGHT, Disp: 90 capsule, Rfl: 1    Eliquis 5 mg tablet, TAKE 1 TABLET BY MOUTH TWICE A DAY, Disp: 180 tablet, Rfl: 3    finasteride (Proscar) 5 mg tablet, TAKE 1 TABLET BY MOUTH EVERY DAY, Disp: 90 tablet, Rfl: 1    FreeStyle Lite Strips strip, USE AS DIRECTED 1X PER DAY, Disp: 100 strip, Rfl: 3    losartan (Cozaar) 25 mg tablet, TAKE 1 TABLET BY MOUTH EVERY DAY, Disp: 90 tablet, Rfl: 3    melatonin 3 mg tablet, TAKE 1 TABLET (3 MG) BY MOUTH ONCE DAILY AT BEDTIME., Disp: 30 tablet, Rfl: 2    metFORMIN (Glucophage) 1,000 mg tablet, TAKE 1 TABLET BY MOUTH TWICE A DAY, Disp: 180 tablet, Rfl: 0    pyridostigmine (Mestinon) 60 mg tablet, Take 0.5 tablets (30 mg) by mouth 3 times a day. (Patient not taking: Reported on 8/28/2024), Disp: 45 tablet, Rfl: 2  No Known Allergies    Objective   Neurological Exam  Physical Exam    Physical Examination:    General: Alert man who was ambulatory without assistive devices.      Cardiovascular: Asymptomatic during orthostatic testing.  Standing vitals were taken after approximately 1 minute of standing.    Mental Status: Clear sensorium without fluctuation.  Appropriate in conversation.  Fluent unremarkable speech without paraphasic errors or hesitancy.  No  bradyphrenia.    Cranial Nerves:  Funduscopic exam was not well visualized bilaterally on nondilated exam.  Pupils were equal, round and reactive to light with no relative afferent pupillary defect.  Extraocular movements were intact and conjugate without nystagmus.  No ptosis.  Visual fields were full to confrontation tested binocularly.  Facial motor function was symmetrically intact.  Hearing was grossly intact.  No dysarthria.  Shoulder shrug was symmetric.  Tongue protrusion was midline.    Motor: Muscle bulk and tone were normal throughout. There was no pronator drift, orbit, or asymmetry of finger taps. Confrontation strength was symmetrically 5/5 throughout the upper and lower extremities.     Coordination: Finger to finger was accurate bilaterally without dysmetria or intention tremor.  No postural or rest tremor, myoclonus or dystonic posturing.  Symmetric rapid repetitive hand movements.  No bradykinesia.    Gait: Stable and unremarkable.        Assessment/Plan     He remains orthostatic and interestingly it continues to manifest as exhaustion rather than lightheadedness.  He has undergone cardiac evaluations without another etiology of exertional exhaustion being uncovered.    He drops by 40 points systolic today after about 1 minute of standing, and is asymptomatic of this.    He had GI intolerance of pyridostigmine.    He continues to exhibit blood pressure lability with seated pressure of 166/98 today.  As such, I reiterated some reluctance to consider resumption of fludrocortisone or initiation of another agent, such as midodrine, with potential for further exacerbation of hypertension.  Additionally, it is illogical to start such medication when he is on losartan and diltiazem.    As such I reiterated conservative/nonpharmacologic measures.  I specifically recommended that prior to golf outings or other occasions involving prolonged walking/standing, he drink at least 8 ounces of Gatorade, and apply  the abdominal binder snugly.  I advised him to drink at least a few additional glasses of water or electrolyte drink throughout the course of the day.    His REM behavior disorder symptoms are almost entirely controlled on a low-dose of melatonin at bedtime and I advised him to continue.  Should his symptoms recur then I discussed doubling the dose of melatonin.    He and his wife continue to observe an occasional tremor but again it is not evident in the office today, nor does he exhibit any parkinsonian features.    I advised that he follow-up in 9 months.

## 2024-09-24 ENCOUNTER — APPOINTMENT (OUTPATIENT)
Dept: NEUROLOGY | Facility: CLINIC | Age: 79
End: 2024-09-24
Payer: MEDICARE

## 2024-09-25 DIAGNOSIS — E11.9 TYPE 2 DIABETES MELLITUS WITHOUT COMPLICATION, UNSPECIFIED WHETHER LONG TERM INSULIN USE (MULTI): ICD-10-CM

## 2024-09-26 RX ORDER — METFORMIN HYDROCHLORIDE 1000 MG/1
1000 TABLET ORAL 2 TIMES DAILY
Qty: 180 TABLET | Refills: 0 | Status: SHIPPED | OUTPATIENT
Start: 2024-09-26

## 2024-10-07 ENCOUNTER — APPOINTMENT (OUTPATIENT)
Dept: CARDIOLOGY | Facility: HOSPITAL | Age: 79
End: 2024-10-07
Payer: MEDICARE

## 2024-10-07 VITALS
SYSTOLIC BLOOD PRESSURE: 134 MMHG | BODY MASS INDEX: 30.46 KG/M2 | DIASTOLIC BLOOD PRESSURE: 75 MMHG | HEIGHT: 67 IN | OXYGEN SATURATION: 97 % | WEIGHT: 194.1 LBS | HEART RATE: 83 BPM

## 2024-10-07 DIAGNOSIS — I48.0 PAROXYSMAL ATRIAL FIBRILLATION (MULTI): Chronic | ICD-10-CM

## 2024-10-07 DIAGNOSIS — R68.89 EXERCISE INTOLERANCE: Primary | Chronic | ICD-10-CM

## 2024-10-07 DIAGNOSIS — I71.21 ANEURYSM OF ASCENDING AORTA WITHOUT RUPTURE (CMS-HCC): Chronic | ICD-10-CM

## 2024-10-07 DIAGNOSIS — I10 PRIMARY HYPERTENSION: Chronic | ICD-10-CM

## 2024-10-07 PROCEDURE — 3078F DIAST BP <80 MM HG: CPT | Performed by: INTERNAL MEDICINE

## 2024-10-07 PROCEDURE — 1036F TOBACCO NON-USER: CPT | Performed by: INTERNAL MEDICINE

## 2024-10-07 PROCEDURE — 3075F SYST BP GE 130 - 139MM HG: CPT | Performed by: INTERNAL MEDICINE

## 2024-10-07 PROCEDURE — G2211 COMPLEX E/M VISIT ADD ON: HCPCS | Performed by: INTERNAL MEDICINE

## 2024-10-07 PROCEDURE — 99214 OFFICE O/P EST MOD 30 MIN: CPT | Performed by: INTERNAL MEDICINE

## 2024-10-07 NOTE — PROGRESS NOTES
"Chief Complaint:   No chief complaint on file.     History Of Present Illness:    Guido Bell is a 78 y.o. male here for followup. After an episode of persistent palpitations with malaise 5/2018 he was diagnosed with atrial fibrillation. Spontaneously converted to NSR. Had rate control with diltiazem gtt which was ultimately converted to oral. Was started on Eliquis for AC. He has been struggling with orthostatic intolerance and periods of intermittent exhaustion after physical exertion.    Has had a negative cardiovascular workup thus far for his exhaustion episodes. Wore an event monitor since his last visit here which was unremarkable. Follows with neurology for orthostatic intolerance.      Last Recorded Vitals:  Vitals:    10/07/24 1511   BP: 134/75   BP Location: Left arm   Pulse: 83   SpO2: 97%   Weight: 88 kg (194 lb 1.6 oz)   Height: 1.702 m (5' 7\")                 Allergies:  Patient has no known allergies.    Outpatient Medications:  Current Outpatient Medications   Medication Instructions    atorvastatin (LIPITOR) 10 mg, oral, Daily    blood sugar diagnostic (Accu-Chek Teresa Plus test strp) strip TEST AS DIRECTED, EVERY OTHER DAY AND AS NEEDED FOR LOW BLOOD SUGAR.    cyanocobalamin, vitamin B-12, (VITAMIN B-12 ORAL) oral    dilTIAZem ER (TIADYLT ER) 360 mg, oral, Daily    DULoxetine (CYMBALTA) 30 mg, oral, Nightly    Eliquis 5 mg, oral, 2 times daily    finasteride (PROSCAR) 5 mg, oral, Daily    FreeStyle Lite Strips strip USE AS DIRECTED 1X PER DAY    losartan (COZAAR) 25 mg, oral, Daily    melatonin 3 mg, oral, Nightly    metFORMIN (GLUCOPHAGE) 1,000 mg, oral, 2 times daily    vit C/E/Zn/coppr/lutein/zeaxan (PRESERVISION AREDS-2 ORAL) oral         Physical Exam:  Gen Well appearing late septuagenarian male sitting up in NAD. Body mass index is 30.4 kg/m².   CV rrr. No m/r/g appreciated. No JVD or leg edema.    Pulm Lungs clear with normal respiratory effort.  Neuro Alert and conversant. Grossly " nonfocal.         I reviewed most recent imaging / labs / recent event monitor and office notes.       Assessment/Plan   1. Exertional fatigue / exercise intolerance  Etiology unclear. Stress testing was unremarkable and did not reproduce his symptoms. His two week monitor was also unremarkable despite having symptoms while wearing it. For continued neurology follow-up.    2. Atrial fibrillation  Paroxysmal. No recent episodes. He has been symptomatic with recurrences in the past despite evidence of rate control. We had discussed rhythm control options and he has declined citing overall tolerance for recurrences. He was previously cautioned that in the future his episodes may be longer lasting and may not be tolerable and that rhythm control could be initiated should that occur. He is on Eliquis for AC and despite his prior bleeding issues and appears to be tolerating it well. Monitoring.      3. Hypertension   BP improved today and acceptable given his known white coat hypertension significant degree of orthostatic intolerance which makes BP management difficult. His present regimen is to continue.     4. Aortic aneurysm  Mild. For yearly repeat echocardiograms (next 6/2025). On statin therapy. BP management as above.        Followup next June with a same day echocardiogram.         Luis Fonseca MD

## 2024-10-29 ENCOUNTER — TELEPHONE (OUTPATIENT)
Dept: PRIMARY CARE | Facility: CLINIC | Age: 79
End: 2024-10-29

## 2024-10-30 DIAGNOSIS — Z00.00 ENCOUNTER FOR GENERAL ADULT MEDICAL EXAMINATION WITHOUT ABNORMAL FINDINGS: ICD-10-CM

## 2024-10-30 RX ORDER — DULOXETIN HYDROCHLORIDE 30 MG/1
30 CAPSULE, DELAYED RELEASE ORAL NIGHTLY
Qty: 90 CAPSULE | Refills: 1 | Status: SHIPPED | OUTPATIENT
Start: 2024-10-30

## 2024-10-30 NOTE — TELEPHONE ENCOUNTER
They are asking what's the reason you prescribed it? Like what's the reason he's taking the prescription.

## 2024-11-18 DIAGNOSIS — G47.52 REM SLEEP BEHAVIOR DISORDER: ICD-10-CM

## 2024-11-18 RX ORDER — TALC
3 POWDER (GRAM) TOPICAL NIGHTLY
Qty: 30 TABLET | Refills: 2 | Status: SHIPPED | OUTPATIENT
Start: 2024-11-18

## 2024-12-20 DIAGNOSIS — I48.0 PAROXYSMAL ATRIAL FIBRILLATION (MULTI): Primary | ICD-10-CM

## 2025-01-02 DIAGNOSIS — E11.9 TYPE 2 DIABETES MELLITUS WITHOUT COMPLICATION, UNSPECIFIED WHETHER LONG TERM INSULIN USE (MULTI): ICD-10-CM

## 2025-01-03 RX ORDER — METFORMIN HYDROCHLORIDE 1000 MG/1
1000 TABLET ORAL 2 TIMES DAILY
Qty: 180 TABLET | Refills: 0 | Status: SHIPPED | OUTPATIENT
Start: 2025-01-03

## 2025-01-31 DIAGNOSIS — Z00.00 ENCOUNTER FOR GENERAL ADULT MEDICAL EXAMINATION WITHOUT ABNORMAL FINDINGS: ICD-10-CM

## 2025-02-01 RX ORDER — FINASTERIDE 5 MG/1
5 TABLET, FILM COATED ORAL DAILY
Qty: 90 TABLET | Refills: 1 | Status: SHIPPED | OUTPATIENT
Start: 2025-02-01

## 2025-02-26 DIAGNOSIS — G47.52 REM SLEEP BEHAVIOR DISORDER: ICD-10-CM

## 2025-02-26 RX ORDER — TALC
3 POWDER (GRAM) TOPICAL NIGHTLY
Qty: 30 TABLET | Refills: 2 | Status: SHIPPED | OUTPATIENT
Start: 2025-02-26

## 2025-02-27 ENCOUNTER — TELEMEDICINE (OUTPATIENT)
Dept: PRIMARY CARE | Facility: CLINIC | Age: 80
End: 2025-02-27
Payer: MEDICARE

## 2025-02-27 DIAGNOSIS — U07.1 COVID-19 VIRUS INFECTION: Primary | ICD-10-CM

## 2025-02-27 DIAGNOSIS — R53.83 OTHER FATIGUE: ICD-10-CM

## 2025-02-27 DIAGNOSIS — J01.90 ACUTE SINUSITIS, RECURRENCE NOT SPECIFIED, UNSPECIFIED LOCATION: ICD-10-CM

## 2025-02-28 NOTE — PROGRESS NOTES
Subjective   Patient ID: Guido Blel is a 79 y.o. male who presents for No chief complaint on file..    HPI   Patient initiated telehealth visit this visit is completed via telephone secondary to the recurrence of the COVID-19 pandemic this visit was actually for COVID-patient was at home physician was in the office all medical issues were addressed and discussed with patient patient was not otherwise examined if it was felt that the patient needed to be seen in the office they would have been referred to do so patient verbally consented to visit  Sick visit had tested positive for COVID today after 1 day of prodromal illness wife had been ill for the past 10 days but had never tested positive other common exposures sinus rundown fatigue copious runny nose no chest pain no shortness of breath bowels okay no dysuria  Review of Systems    Objective   There were no vitals taken for this visit.    Physical Exam alert and oriented x 3 breathing unlabored not otherwise examined    Assessment/Plan    impression COVID viral infection sinus fatigue  Plan discussed with medications for COVID there would be multiple medication interactions with the Paxlovid so okay for large Verio for doses in the morning for doses in the evening x 5 days risk-benefit side effects reviewed with him and wishes to proceed see EMR good water consumption proper nutrition proper rest exercise as able continue with regular medications for blood pressure blood thinner lipids and prostate Tylenol may be used Zyrtec may be used throat lozenge may be used 5 days of masking following resolution of symptoms and recheck if no better and for regular visit TT 15''

## 2025-03-01 NOTE — PROGRESS NOTES
Subjective   Patient ID: Guido Bell is a 79 y.o. male who presents for No chief complaint on file..    HPI     Review of Systems    Objective   There were no vitals taken for this visit.    Physical Exam    Assessment/Plan              Alan Peña MD  Physician  Primary Care     Progress Notes      Sign when Signing Visit     Creation Time: 2/27/2025  8:20 PM     Sign when Signing Visit       Expand All Collapse All       Subjective  Patient ID: Guido Bell is a 79 y.o. male who presents for No chief complaint on file..     HPI   Patient initiated telehealth visit this visit is completed via telephone secondary to the recurrence of the COVID-19 pandemic this visit was actually for COVID-patient was at home physician was in the office all medical issues were addressed and discussed with patient patient was not otherwise examined if it was felt that the patient needed to be seen in the office they would have been referred to do so patient verbally consented to visit  Sick visit had tested positive for COVID today after 1 day of prodromal illness wife had been ill for the past 10 days but had never tested positive other common exposures sinus rundown fatigue copious runny nose no chest pain no shortness of breath bowels okay no dysuria  Review of Systems        Objective  There were no vitals taken for this visit.     Physical Exam alert and oriented x 3 breathing unlabored not otherwise examined        Assessment/Plan  impression COVID viral infection sinus fatigue  Plan discussed with medications for COVID there would be multiple medication interactions with the Paxlovid so okay for large Verio for doses in the morning for doses in the evening x 5 days risk-benefit side effects reviewed with him and wishes to proceed see EMR good water consumption proper nutrition proper rest exercise as able continue with regular medications for blood pressure blood thinner lipids and prostate Tylenol may be used Zyrtec  may be used throat lozenge may be used 5 days of masking following resolution of symptoms and recheck if no better and for regular visit TT 15''                         Orders Only on 2/27/2025       Additional Documentation    Encounter Info: Billing Info,     History,     Allergies     Orders Placed    None  Medication Changes      molnupiravir 800 mg oral Every 12 hours  Medication List  Visit Diagnoses      COVID-19 virus infection  Problem List

## 2025-03-09 DIAGNOSIS — I10 ESSENTIAL (PRIMARY) HYPERTENSION: ICD-10-CM

## 2025-03-10 RX ORDER — LOSARTAN POTASSIUM 25 MG/1
25 TABLET ORAL DAILY
Qty: 90 TABLET | Refills: 3 | Status: SHIPPED | OUTPATIENT
Start: 2025-03-10

## 2025-03-30 DIAGNOSIS — E11.9 TYPE 2 DIABETES MELLITUS WITHOUT COMPLICATION, UNSPECIFIED WHETHER LONG TERM INSULIN USE: ICD-10-CM

## 2025-04-01 ENCOUNTER — APPOINTMENT (OUTPATIENT)
Dept: PRIMARY CARE | Facility: CLINIC | Age: 80
End: 2025-04-01
Payer: MEDICARE

## 2025-04-01 DIAGNOSIS — I10 BENIGN ESSENTIAL HYPERTENSION: ICD-10-CM

## 2025-04-01 DIAGNOSIS — B96.89 ACUTE BACTERIAL BRONCHITIS: ICD-10-CM

## 2025-04-01 DIAGNOSIS — J20.8 ACUTE BACTERIAL BRONCHITIS: ICD-10-CM

## 2025-04-01 DIAGNOSIS — Z00.00 HEALTH CARE MAINTENANCE: Primary | ICD-10-CM

## 2025-04-01 DIAGNOSIS — G47.52 REM SLEEP BEHAVIOR DISORDER: ICD-10-CM

## 2025-04-01 PROCEDURE — 99213 OFFICE O/P EST LOW 20 MIN: CPT | Performed by: INTERNAL MEDICINE

## 2025-04-01 PROCEDURE — G2211 COMPLEX E/M VISIT ADD ON: HCPCS | Performed by: INTERNAL MEDICINE

## 2025-04-01 RX ORDER — TALC
3 POWDER (GRAM) TOPICAL NIGHTLY
Qty: 30 TABLET | Refills: 2 | Status: SHIPPED | OUTPATIENT
Start: 2025-04-01

## 2025-04-01 RX ORDER — AZITHROMYCIN 250 MG/1
TABLET, FILM COATED ORAL
Qty: 6 TABLET | Refills: 0 | Status: SHIPPED | OUTPATIENT
Start: 2025-04-01 | End: 2025-04-06

## 2025-04-01 NOTE — PROGRESS NOTES
Subjective   Patient ID: Guido Bell is a 79 y.o. male who presents for No chief complaint on file..    HPI sick visit same day after hours no staff no chest pain no shortness of breath no side effect with medication but has been coughing    Review of Systems    Objective   There were no vitals taken for this visit.    Physical Exam vital signs noted alert and oriented x 3 NCAT no JVD or bruit chest clear to auscultation with some crackles and bronchial breath sounds like anterior and upper CV regular rate and rhythm S1-S2 without murmur gallop or rub extremities no clubbing cyanosis or edema normal distal pulses    Assessment/Plan    impression upper respiratory tract infection/bronchitis hypertension diabetes insomnia discussed with  Plan good diet regular exercise good water consumption okay for Zithromax.  50 mg tablet take 1 breeches next 4 days #1 traditional Z-Edi and 0 refills Tylenol as needed events of coming in getting admitted sleep and would like to use a bone tendon okay for 3 mg he prefers 5 prescription then recheck  Discussed with sleep and sleep behavior  Regular visit normal doing blood sugars and blood pressure

## 2025-04-05 RX ORDER — METFORMIN HYDROCHLORIDE 1000 MG/1
1000 TABLET ORAL 2 TIMES DAILY
Qty: 180 TABLET | Refills: 0 | Status: SHIPPED | OUTPATIENT
Start: 2025-04-05

## 2025-05-10 DIAGNOSIS — R31.9 HEMATURIA, UNSPECIFIED TYPE: Primary | ICD-10-CM

## 2025-05-11 LAB
APPEARANCE UR: CLEAR
BACTERIA #/AREA URNS HPF: ABNORMAL /HPF
BILIRUB UR QL STRIP: NEGATIVE
COLOR UR: YELLOW
GLUCOSE UR QL STRIP: NEGATIVE
HGB UR QL STRIP: NEGATIVE
HYALINE CASTS #/AREA URNS LPF: ABNORMAL /LPF
KETONES UR QL STRIP: NEGATIVE
LEUKOCYTE ESTERASE UR QL STRIP: NEGATIVE
NITRITE UR QL STRIP: NEGATIVE
PH UR STRIP: 7 [PH] (ref 5–8)
PROT UR QL STRIP: ABNORMAL
RBC #/AREA URNS HPF: ABNORMAL /HPF
SERVICE CMNT-IMP: ABNORMAL
SP GR UR STRIP: 1.01 (ref 1–1.03)
SQUAMOUS #/AREA URNS HPF: ABNORMAL /HPF
WBC #/AREA URNS HPF: ABNORMAL /HPF

## 2025-05-12 ENCOUNTER — OFFICE VISIT (OUTPATIENT)
Dept: PRIMARY CARE | Facility: CLINIC | Age: 80
End: 2025-05-12
Payer: MEDICARE

## 2025-05-12 DIAGNOSIS — E11.9 TYPE 2 DIABETES MELLITUS WITHOUT COMPLICATION, UNSPECIFIED WHETHER LONG TERM INSULIN USE: ICD-10-CM

## 2025-05-12 DIAGNOSIS — R31.9 HEMATURIA, UNSPECIFIED TYPE: ICD-10-CM

## 2025-05-12 DIAGNOSIS — Z00.00 HEALTH CARE MAINTENANCE: Primary | ICD-10-CM

## 2025-05-12 DIAGNOSIS — I10 BENIGN ESSENTIAL HYPERTENSION: ICD-10-CM

## 2025-05-12 DIAGNOSIS — N40.0 BENIGN PROSTATIC HYPERPLASIA, UNSPECIFIED WHETHER LOWER URINARY TRACT SYMPTOMS PRESENT: ICD-10-CM

## 2025-05-12 DIAGNOSIS — E78.2 COMBINED HYPERLIPIDEMIA: ICD-10-CM

## 2025-05-12 PROCEDURE — 99214 OFFICE O/P EST MOD 30 MIN: CPT | Performed by: INTERNAL MEDICINE

## 2025-05-12 NOTE — PROGRESS NOTES
Subjective   Patient ID: Guido Bell is a 79 y.o. male who presents for No chief complaint on file..    HPI sick visit same day after hours no staff no chest pain no shortness of breath had 2 episodes of hematuria he had obtained a urinalysis but it was completely normal there were no clots there was no pain he [place] no polyuria polydipsia some BPH symptoms with dribbling    Review of Systems    Objective   There were no vitals taken for this visit.    Physical Exam vital signs noted blood pressure 142/80 weight 187 pounds alert and oriented x 3 NCAT no JVD chest clear to auscultation CV regular rate and rhythm S1-S2 extremities no clubbing cyanosis or edema normal distal pulses abdomen soft nontender normal active bowel sounds no flank tenderness impression  Assessment/Plan        Hematuria diabetes hypertension hyperlipidemia  Plan check A1c advised on long-term blood sugar test check comprehensive panel advised on glucose potassium and kidney function as well as liver function check lipid panel advised on cholesterol profile check PSA advised on prostate recall if on Proscar prior was normal he is on Eliquis follow-up with imaging or urology continue with other medications and recheck based on above    Finish note for plan

## 2025-05-14 LAB
ALBUMIN SERPL-MCNC: 4.2 G/DL (ref 3.6–5.1)
ALP SERPL-CCNC: 76 U/L (ref 35–144)
ALT SERPL-CCNC: 15 U/L (ref 9–46)
ANION GAP SERPL CALCULATED.4IONS-SCNC: 10 MMOL/L (CALC) (ref 7–17)
AST SERPL-CCNC: 16 U/L (ref 10–35)
BILIRUB SERPL-MCNC: 0.7 MG/DL (ref 0.2–1.2)
BUN SERPL-MCNC: 27 MG/DL (ref 7–25)
CALCIUM SERPL-MCNC: 9.6 MG/DL (ref 8.6–10.3)
CHLORIDE SERPL-SCNC: 100 MMOL/L (ref 98–110)
CHOLEST SERPL-MCNC: 122 MG/DL
CHOLEST/HDLC SERPL: 2.9 (CALC)
CO2 SERPL-SCNC: 30 MMOL/L (ref 20–32)
CREAT SERPL-MCNC: 1.29 MG/DL (ref 0.7–1.28)
EGFRCR SERPLBLD CKD-EPI 2021: 56 ML/MIN/1.73M2
ERYTHROCYTE [DISTWIDTH] IN BLOOD BY AUTOMATED COUNT: 12.5 % (ref 11–15)
EST. AVERAGE GLUCOSE BLD GHB EST-MCNC: 157 MG/DL
EST. AVERAGE GLUCOSE BLD GHB EST-SCNC: 8.7 MMOL/L
GLUCOSE SERPL-MCNC: 110 MG/DL (ref 65–139)
HBA1C MFR BLD: 7.1 %
HCT VFR BLD AUTO: 41.1 % (ref 38.5–50)
HDLC SERPL-MCNC: 42 MG/DL
HGB BLD-MCNC: 13.2 G/DL (ref 13.2–17.1)
LDLC SERPL CALC-MCNC: 61 MG/DL (CALC)
MCH RBC QN AUTO: 29.7 PG (ref 27–33)
MCHC RBC AUTO-ENTMCNC: 32.1 G/DL (ref 32–36)
MCV RBC AUTO: 92.4 FL (ref 80–100)
NONHDLC SERPL-MCNC: 80 MG/DL (CALC)
PLATELET # BLD AUTO: 182 THOUSAND/UL (ref 140–400)
PMV BLD REES-ECKER: 11.6 FL (ref 7.5–12.5)
POTASSIUM SERPL-SCNC: 4.2 MMOL/L (ref 3.5–5.3)
PROT SERPL-MCNC: 6.5 G/DL (ref 6.1–8.1)
PSA SERPL-MCNC: 1.04 NG/ML
RBC # BLD AUTO: 4.45 MILLION/UL (ref 4.2–5.8)
SODIUM SERPL-SCNC: 140 MMOL/L (ref 135–146)
TRIGL SERPL-MCNC: 109 MG/DL
WBC # BLD AUTO: 7.2 THOUSAND/UL (ref 3.8–10.8)

## 2025-05-26 NOTE — PROGRESS NOTES
Subjective   Patient ID: Guido Bell is a 79 y.o. male who presents for No chief complaint on file..    HPI follow-up visit and sick visit had some blood in the urine and obtained a urinalysis no polyuria polydipsia no dysuria no flank tenderness no fever discussed with blood sugars in general as well as diet and water consumption no chest pain no shortness of breath no side effect with medication    Review of Systems    Objective   There were no vitals taken for this visit.    Physical Exam vital signs noted blood pressure 137/77 alert and oriented x 3 NCAT no JVD chest clear to auscultation cor regular rate and rhythm S1-S2 without murmur gallop or rub abdomen soft nontender normal active bowel sounds no flank tenderness extremities no clubbing cyanosis or edema normal distal pulses    Assessment/Plan    impression hematuria, diabetes mellitus Hypertension BPH         Alan Peña MD  Physician  Primary Care     Progress Notes      Signed     Creation Time: 5/12/2025  6:47 PM     Signed                Electronically signed by Alan Peña MD at 5/20/2025  1:17 AM         Orders Only on 5/12/2025          Note shared with patient  Additional Documentation    Encounter Info: Billing Info,     History,     Allergies     Orders Placed      Comprehensive metabolic panel    Hemoglobin A1c    CBC    Lipid panel    Prostate Spec.Ag,Screen    Referral to Urology Authorized  All Encounter Results  Medication Changes      None  Medication List  Visit Diagnoses      Health care maintenance    Benign essential hypertension    Type 2 diabetes mellitus without complication, unspecified whether long term insulin use    Combined hyperlipidemia    Hematuria, unspecified type  Problem List          Alan Peña MD  Physician  Primary Care     Progress Notes      Signed     Creation Time: 5/12/2025  6:47 PM     Signed       Expand All Collapse All[]Expand All by Default    Subjective  Patient ID: Guido Bell is a 79  y.o. male who presents for No chief complaint on file..     HPI sick visit same day after hours no staff no chest pain no shortness of breath had 2 episodes of hematuria he had obtained a urinalysis but it was completely normal there were no clots there was no pain he [place] smoked [place] no polyuria polydipsia some BPH symptoms with dribbling     Review of Systems     Objective  There were no vitals taken for this visit.     Physical Exam vital signs noted blood pressure 142/80 weight 187 pounds alert and oriented x 3 NCAT no JVD chest clear to auscultation CV regular rate and rhythm S1-S2 extremities no clubbing cyanosis or edema normal distal pulses abdomen soft nontender normal active bowel sounds no flank tenderness impression  Assessment/Plan   Hematuria diabetes hypertension hyperlipidemia  Plan check A1c advised on long-term blood sugar test check comprehensive panel advised on glucose potassium and kidney function as well as liver function check lipid panel advised on cholesterol profile check PSA advised on prostate recall if on Proscar prior was normal he is on Eliquis follow-up with imaging or urology continue with other medications and recheck based on above     Follow up [id number]            Electronically signed by Alan Peña MD at 5/20/2025  1:17 AM         Orders Only on 5/12/2025          Note shared with patient  Additional Documentation    Encounter Info: Billing Info,     History,     Allergies     Orders Placed      Comprehensive metabolic panel    Hemoglobin A1c    CBC    Lipid panel    Prostate Spec.Ag,Screen    Referral to Urology Authorized  All Encounter Results  Medication Changes      None  Medication List  Visit Diagnoses      Health care maintenance    Benign essential hypertension    Type 2 diabetes mellitus without complication, unspecified whether long term insulin use    Combined hyperlipidemia    Hematuria, unspecified type  Problem List

## 2025-05-27 ENCOUNTER — APPOINTMENT (OUTPATIENT)
Dept: UROLOGY | Facility: CLINIC | Age: 80
End: 2025-05-27
Payer: MEDICARE

## 2025-05-27 DIAGNOSIS — N13.8 BPH WITH OBSTRUCTION/LOWER URINARY TRACT SYMPTOMS: ICD-10-CM

## 2025-05-27 DIAGNOSIS — N40.1 BPH WITH OBSTRUCTION/LOWER URINARY TRACT SYMPTOMS: ICD-10-CM

## 2025-05-27 DIAGNOSIS — R31.0 GROSS HEMATURIA: Primary | ICD-10-CM

## 2025-05-27 LAB
POC APPEARANCE, URINE: CLEAR
POC BILIRUBIN, URINE: NEGATIVE
POC BLOOD, URINE: ABNORMAL
POC COLOR, URINE: YELLOW
POC GLUCOSE, URINE: NEGATIVE MG/DL
POC KETONES, URINE: NEGATIVE MG/DL
POC LEUKOCYTES, URINE: NEGATIVE
POC NITRITE,URINE: NEGATIVE
POC PH, URINE: 6 PH
POC PROTEIN, URINE: ABNORMAL MG/DL
POC SPECIFIC GRAVITY, URINE: 1.02
POC UROBILINOGEN, URINE: 0.2 EU/DL

## 2025-05-27 PROCEDURE — 88112 CYTOPATH CELL ENHANCE TECH: CPT | Performed by: PATHOLOGY

## 2025-05-27 PROCEDURE — 88112 CYTOPATH CELL ENHANCE TECH: CPT

## 2025-05-27 PROCEDURE — 1036F TOBACCO NON-USER: CPT | Performed by: UROLOGY

## 2025-05-27 PROCEDURE — 99204 OFFICE O/P NEW MOD 45 MIN: CPT | Performed by: UROLOGY

## 2025-05-27 PROCEDURE — 81003 URINALYSIS AUTO W/O SCOPE: CPT | Performed by: UROLOGY

## 2025-05-27 PROCEDURE — 1159F MED LIST DOCD IN RCRD: CPT | Performed by: UROLOGY

## 2025-05-27 RX ORDER — SULFAMETHOXAZOLE AND TRIMETHOPRIM 800; 160 MG/1; MG/1
1 TABLET ORAL ONCE
Qty: 1 TABLET | Refills: 0 | Status: SHIPPED | OUTPATIENT
Start: 2025-05-27 | End: 2025-05-27

## 2025-05-27 NOTE — PATIENT INSTRUCTIONS
Radiology [phone]  Take antibiotic 1 hour prior to cystoscopy.  We will need a urine sample during that appointment, so please arrive with a full enough bladder to leave a sample.  There are no restrictions in medications, eating/drinking, and driving.

## 2025-05-27 NOTE — PROGRESS NOTES
Subjective   Guido Bell is a 79 y.o. male    BPH with LUTS.  Reports nocturia x 3, urinary urgency and frequency despite being on tamsulosin.  States these are not bothersome.  2.  1 episode of gross hematuria now resolved    IPSS: 15/5    Medical History[1]  Surgical History[2]  Family History[3]  Current Medications[4]  Allergies[5]  Social History     Socioeconomic History    Marital status:      Spouse name: Not on file    Number of children: Not on file    Years of education: Not on file    Highest education level: Not on file   Occupational History    Not on file   Tobacco Use    Smoking status: Never    Smokeless tobacco: Never   Substance and Sexual Activity    Alcohol use: Never    Drug use: Never    Sexual activity: Not on file   Other Topics Concern    Not on file   Social History Narrative    Not on file     Social Drivers of Health     Financial Resource Strain: Not on File (2021)    Received from [employer]    Financial Resource Strain     Financial Resource Strain: 0   Food Insecurity: Not on File (2021)    Received from [employer]    Food Insecurity     Food: 0   Transportation Needs: Not on File (2021)    Received from [employer]    Transportation Needs     Transportation: 0   Physical Activity: Not on File (2021)    Received from [employer]    Physical Activity     Physical Activity: 0   Stress: Not on File (2021)    Received from [employer]    Stress     Stress: 0   Social Connections: Not on File (2021)    Received from [employer]    Social Connections     Social Connections and Isolation: 0   Intimate Partner Violence: Not on file   Housing Stability: Not on File (2021)    Received from [employer]    Housing Stability     Housin       Review of Systems  Pertinent items are noted in HPI.    Objective       Lab Review  Lab Results   Component Value Date    WBC 7.2 2025    RBC 4.45 2025    HGB 13.2 2025    HCT 41.1 2025     2025      Lab Results    Component Value Date    BUN 27 (H) 05/13/2025    CREATININE 1.29 (H) 05/13/2025      Lab Results   Component Value Date    PSA 1.04 05/13/2025       Assessment/Plan   Diagnoses and all orders for this visit:  Gross hematuria  -     Referral to Urology  -     POCT UA Automated manually resulted    BPH with LUTS.  Continue tamsulosin, will follow  Gross hematuria  CT urogram  The risks of cystoscopy were discussed with the patient in great detail, including risk of hematuria, UTI and discomfort. Antibiotic will be prescribed to be taken 1 hour prior to the procedure. Patient agrees to proceed.       All questions were answered to the patient's satisfaction. Patient agrees with the plan and wishes to proceed. Follow-up will be scheduled appropriately.     E&M visit today is associated with current or anticipated ongoing medical care services related to a patient's single, serious condition or a complex condition.         [1]   Past Medical History:  Diagnosis Date    Elevated prostate specific antigen (PSA)     Elevated prostate specific antigen (PSA)    Other disorders of intestinal carbohydrate absorption     Other disorders of intestinal carbohydrate absorption    Personal history of other diseases of male genital organs     History of benign prostatic hypertrophy    Personal history of other diseases of the circulatory system     History of hypertension    Personal history of other diseases of the nervous system and sense organs 09/09/2018    History of tension headache    Personal history of other endocrine, nutritional and metabolic disease     History of diabetes mellitus    Personal history of other endocrine, nutritional and metabolic disease     History of hyperlipidemia    Rectal polyp 07/11/2014    Rectal polyp    Spondylosis without myelopathy or radiculopathy, cervical region 06/02/2018    Cervical osteoarthritis   [2]   Past Surgical History:  Procedure Laterality Date    TOTAL HIP ARTHROPLASTY   12/09/2013    Total Hip Replacement   [3]   Family History  Problem Relation Name Age of Onset    Other (arteriosclerotic cardiovascular disease) Father     [4]   Current Outpatient Medications   Medication Sig Dispense Refill    apixaban (Eliquis) 5 mg tablet Take 1 tablet (5 mg) by mouth 2 times a day. 180 tablet 3    atorvastatin (Lipitor) 10 mg tablet TAKE 1 TABLET BY MOUTH EVERY DAY 90 tablet 2    blood sugar diagnostic (Accu-Chek Teresa Plus test strp) strip TEST AS DIRECTED, EVERY OTHER DAY AND AS NEEDED FOR LOW BLOOD SUGAR.      cyanocobalamin, vitamin B-12, (VITAMIN B-12 ORAL) Take by mouth.      dilTIAZem ER (Tiadylt ER) 360 mg 24 hr capsule Take 1 capsule (360 mg) by mouth once daily. 90 capsule 3    DULoxetine (Cymbalta) 30 mg DR capsule Take 1 capsule (30 mg) by mouth once daily at bedtime. 90 capsule 1    finasteride (Proscar) 5 mg tablet TAKE 1 TABLET BY MOUTH EVERY DAY 90 tablet 1    FreeStyle Lite Strips strip USE AS DIRECTED 1X PER  strip 3    losartan (Cozaar) 25 mg tablet TAKE 1 TABLET BY MOUTH EVERY DAY 90 tablet 3    melatonin 3 mg tablet Take 1 tablet (3 mg) by mouth once daily at bedtime. 30 tablet 2    metFORMIN (Glucophage) 1,000 mg tablet TAKE 1 TABLET BY MOUTH TWICE A  tablet 0    vit C/E/Zn/coppr/lutein/zeaxan (PRESERVISION AREDS-2 ORAL) Take by mouth.      molnupiravir (Lagevrio) capsule capsule Take 4 capsules (800 mg) by mouth every 12 hours. 40 capsule 0     No current facility-administered medications for this visit.   [5] No Known Allergies

## 2025-05-29 LAB
BACTERIA UR CULT: NORMAL
LABORATORY COMMENT REPORT: NORMAL
LABORATORY COMMENT REPORT: NORMAL
PATH REPORT.FINAL DX SPEC: NORMAL
PATH REPORT.GROSS SPEC: NORMAL
PATH REPORT.RELEVANT HX SPEC: NORMAL
PATH REPORT.TOTAL CANCER: NORMAL
RESIDENT REVIEW: NORMAL

## 2025-06-02 ENCOUNTER — APPOINTMENT (OUTPATIENT)
Dept: RADIOLOGY | Facility: HOSPITAL | Age: 80
End: 2025-06-02
Payer: MEDICARE

## 2025-06-02 ENCOUNTER — APPOINTMENT (OUTPATIENT)
Dept: CARDIOLOGY | Facility: HOSPITAL | Age: 80
End: 2025-06-02
Payer: MEDICARE

## 2025-06-02 ENCOUNTER — HOSPITAL ENCOUNTER (EMERGENCY)
Facility: HOSPITAL | Age: 80
Discharge: HOME | End: 2025-06-02
Attending: EMERGENCY MEDICINE
Payer: MEDICARE

## 2025-06-02 VITALS
HEART RATE: 102 BPM | RESPIRATION RATE: 18 BRPM | BODY MASS INDEX: 30.61 KG/M2 | OXYGEN SATURATION: 96 % | DIASTOLIC BLOOD PRESSURE: 102 MMHG | WEIGHT: 195 LBS | SYSTOLIC BLOOD PRESSURE: 155 MMHG | HEIGHT: 67 IN | TEMPERATURE: 97.1 F
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
## 2025-06-02 DIAGNOSIS — S06.0X0A CONCUSSION WITHOUT LOSS OF CONSCIOUSNESS, INITIAL ENCOUNTER: ICD-10-CM

## 2025-06-02 LAB
ALBUMIN SERPL BCP-MCNC: 4.2 G/DL (ref 3.4–5)
ALP SERPL-CCNC: 83 U/L (ref 33–136)
ALT SERPL W P-5'-P-CCNC: 17 U/L (ref 10–52)
ANION GAP SERPL CALC-SCNC: 17 MMOL/L (ref 10–20)
APPEARANCE UR: CLEAR
APTT PPP: 45 SECONDS (ref 26–36)
AST SERPL W P-5'-P-CCNC: 15 U/L (ref 9–39)
ATRIAL RATE: 92 BPM
BASOPHILS # BLD AUTO: 0.05 X10*3/UL (ref 0–0.1)
BASOPHILS NFR BLD AUTO: 0.7 %
BILIRUB SERPL-MCNC: 0.5 MG/DL (ref 0–1.2)
BILIRUB UR STRIP.AUTO-MCNC: NEGATIVE MG/DL
BUN SERPL-MCNC: 23 MG/DL (ref 6–23)
CALCIUM SERPL-MCNC: 9.3 MG/DL (ref 8.6–10.3)
CHLORIDE SERPL-SCNC: 100 MMOL/L (ref 98–107)
CO2 SERPL-SCNC: 25 MMOL/L (ref 21–32)
COLOR UR: COLORLESS
CREAT SERPL-MCNC: 1.07 MG/DL (ref 0.5–1.3)
EGFRCR SERPLBLD CKD-EPI 2021: 71 ML/MIN/1.73M*2
EOSINOPHIL # BLD AUTO: 0.36 X10*3/UL (ref 0–0.4)
EOSINOPHIL NFR BLD AUTO: 5.1 %
ERYTHROCYTE [DISTWIDTH] IN BLOOD BY AUTOMATED COUNT: 13 % (ref 11.5–14.5)
GLUCOSE SERPL-MCNC: 137 MG/DL (ref 74–99)
GLUCOSE UR STRIP.AUTO-MCNC: NORMAL MG/DL
HCT VFR BLD AUTO: 41 % (ref 41–52)
HGB BLD-MCNC: 13.2 G/DL (ref 13.5–17.5)
IMM GRANULOCYTES # BLD AUTO: 0.04 X10*3/UL (ref 0–0.5)
IMM GRANULOCYTES NFR BLD AUTO: 0.6 % (ref 0–0.9)
INR PPP: 1.1 (ref 0.9–1.1)
KETONES UR STRIP.AUTO-MCNC: NEGATIVE MG/DL
LEUKOCYTE ESTERASE UR QL STRIP.AUTO: NEGATIVE
LYMPHOCYTES # BLD AUTO: 1.79 X10*3/UL (ref 0.8–3)
LYMPHOCYTES NFR BLD AUTO: 25.2 %
MCH RBC QN AUTO: 29.8 PG (ref 26–34)
MCHC RBC AUTO-ENTMCNC: 32.2 G/DL (ref 32–36)
MCV RBC AUTO: 93 FL (ref 80–100)
MONOCYTES # BLD AUTO: 0.75 X10*3/UL (ref 0.05–0.8)
MONOCYTES NFR BLD AUTO: 10.5 %
MUCOUS THREADS #/AREA URNS AUTO: NORMAL /LPF
NEUTROPHILS # BLD AUTO: 4.12 X10*3/UL (ref 1.6–5.5)
NEUTROPHILS NFR BLD AUTO: 57.9 %
NITRITE UR QL STRIP.AUTO: NEGATIVE
NRBC BLD-RTO: 0 /100 WBCS (ref 0–0)
P AXIS: 34 DEGREES
P OFFSET: 197 MS
P ONSET: 133 MS
PH UR STRIP.AUTO: 7.5 [PH]
PLATELET # BLD AUTO: 173 X10*3/UL (ref 150–450)
POTASSIUM SERPL-SCNC: 3.9 MMOL/L (ref 3.5–5.3)
PR INTERVAL: 188 MS
PROT SERPL-MCNC: 6.2 G/DL (ref 6.4–8.2)
PROT UR STRIP.AUTO-MCNC: ABNORMAL MG/DL
PROTHROMBIN TIME: 12.6 SECONDS (ref 9.8–12.4)
Q ONSET: 227 MS
QRS COUNT: 15 BEATS
QRS DURATION: 86 MS
QT INTERVAL: 376 MS
QTC CALCULATION(BAZETT): 464 MS
QTC FREDERICIA: 433 MS
R AXIS: 2 DEGREES
RBC # BLD AUTO: 4.43 X10*6/UL (ref 4.5–5.9)
RBC # UR STRIP.AUTO: NEGATIVE MG/DL
RBC #/AREA URNS AUTO: NORMAL /HPF
SODIUM SERPL-SCNC: 138 MMOL/L (ref 136–145)
SP GR UR STRIP.AUTO: 1.01
T AXIS: 48 DEGREES
T OFFSET: 415 MS
UROBILINOGEN UR STRIP.AUTO-MCNC: NORMAL MG/DL
VENTRICULAR RATE: 92 BPM
WBC # BLD AUTO: 7.1 X10*3/UL (ref 4.4–11.3)
WBC #/AREA URNS AUTO: NORMAL /HPF

## 2025-06-02 PROCEDURE — 70450 CT HEAD/BRAIN W/O DYE: CPT

## 2025-06-02 PROCEDURE — 93005 ELECTROCARDIOGRAM TRACING: CPT

## 2025-06-02 PROCEDURE — 96375 TX/PRO/DX INJ NEW DRUG ADDON: CPT

## 2025-06-02 PROCEDURE — 76377 3D RENDER W/INTRP POSTPROCES: CPT

## 2025-06-02 PROCEDURE — 85025 COMPLETE CBC W/AUTO DIFF WBC: CPT | Performed by: EMERGENCY MEDICINE

## 2025-06-02 PROCEDURE — 96374 THER/PROPH/DIAG INJ IV PUSH: CPT

## 2025-06-02 PROCEDURE — 36415 COLL VENOUS BLD VENIPUNCTURE: CPT | Performed by: EMERGENCY MEDICINE

## 2025-06-02 PROCEDURE — 72125 CT NECK SPINE W/O DYE: CPT

## 2025-06-02 PROCEDURE — 2500000004 HC RX 250 GENERAL PHARMACY W/ HCPCS (ALT 636 FOR OP/ED): Performed by: EMERGENCY MEDICINE

## 2025-06-02 PROCEDURE — 2500000001 HC RX 250 WO HCPCS SELF ADMINISTERED DRUGS (ALT 637 FOR MEDICARE OP)

## 2025-06-02 PROCEDURE — 70450 CT HEAD/BRAIN W/O DYE: CPT | Performed by: RADIOLOGY

## 2025-06-02 PROCEDURE — 85730 THROMBOPLASTIN TIME PARTIAL: CPT | Performed by: EMERGENCY MEDICINE

## 2025-06-02 PROCEDURE — 84075 ASSAY ALKALINE PHOSPHATASE: CPT | Performed by: EMERGENCY MEDICINE

## 2025-06-02 PROCEDURE — 72125 CT NECK SPINE W/O DYE: CPT | Performed by: RADIOLOGY

## 2025-06-02 PROCEDURE — 2500000001 HC RX 250 WO HCPCS SELF ADMINISTERED DRUGS (ALT 637 FOR MEDICARE OP): Performed by: EMERGENCY MEDICINE

## 2025-06-02 PROCEDURE — 81001 URINALYSIS AUTO W/SCOPE: CPT | Performed by: EMERGENCY MEDICINE

## 2025-06-02 PROCEDURE — 99285 EMERGENCY DEPT VISIT HI MDM: CPT | Mod: 25 | Performed by: EMERGENCY MEDICINE

## 2025-06-02 PROCEDURE — 70486 CT MAXILLOFACIAL W/O DYE: CPT

## 2025-06-02 RX ORDER — DIPHENHYDRAMINE HYDROCHLORIDE 50 MG/ML
25 INJECTION, SOLUTION INTRAMUSCULAR; INTRAVENOUS ONCE
Status: COMPLETED | OUTPATIENT
Start: 2025-06-02 | End: 2025-06-02

## 2025-06-02 RX ORDER — LABETALOL HYDROCHLORIDE 5 MG/ML
10 INJECTION, SOLUTION INTRAVENOUS ONCE
Status: DISCONTINUED | OUTPATIENT
Start: 2025-06-02 | End: 2025-06-02 | Stop reason: HOSPADM

## 2025-06-02 RX ORDER — ACETAMINOPHEN 325 MG/1
650 TABLET ORAL ONCE
Status: COMPLETED | OUTPATIENT
Start: 2025-06-02 | End: 2025-06-02

## 2025-06-02 RX ORDER — LOSARTAN POTASSIUM 50 MG/1
25 TABLET ORAL ONCE
Status: COMPLETED | OUTPATIENT
Start: 2025-06-02 | End: 2025-06-02

## 2025-06-02 RX ORDER — DILTIAZEM HYDROCHLORIDE 30 MG/1
30 TABLET, FILM COATED ORAL ONCE
Status: DISCONTINUED | OUTPATIENT
Start: 2025-06-02 | End: 2025-06-02

## 2025-06-02 RX ORDER — DILTIAZEM HYDROCHLORIDE 120 MG/1
360 CAPSULE, COATED, EXTENDED RELEASE ORAL ONCE
Status: COMPLETED | OUTPATIENT
Start: 2025-06-02 | End: 2025-06-02

## 2025-06-02 RX ORDER — METOCLOPRAMIDE HYDROCHLORIDE 5 MG/ML
10 INJECTION INTRAMUSCULAR; INTRAVENOUS ONCE
Status: COMPLETED | OUTPATIENT
Start: 2025-06-02 | End: 2025-06-02

## 2025-06-02 RX ADMIN — METOCLOPRAMIDE 10 MG: 5 INJECTION, SOLUTION INTRAMUSCULAR; INTRAVENOUS at 09:29

## 2025-06-02 RX ADMIN — LOSARTAN POTASSIUM 25 MG: 50 TABLET, FILM COATED ORAL at 09:40

## 2025-06-02 RX ADMIN — DILTIAZEM HYDROCHLORIDE 360 MG: 120 CAPSULE, COATED, EXTENDED RELEASE ORAL at 09:40

## 2025-06-02 RX ADMIN — ACETAMINOPHEN 650 MG: 325 TABLET, FILM COATED ORAL at 09:30

## 2025-06-02 RX ADMIN — DIPHENHYDRAMINE HYDROCHLORIDE 25 MG: 50 INJECTION, SOLUTION INTRAMUSCULAR; INTRAVENOUS at 09:30

## 2025-06-02 ASSESSMENT — PAIN DESCRIPTION - PROGRESSION
CLINICAL_PROGRESSION: NOT CHANGED
CLINICAL_PROGRESSION: RESOLVED

## 2025-06-02 ASSESSMENT — COLUMBIA-SUICIDE SEVERITY RATING SCALE - C-SSRS
2. HAVE YOU ACTUALLY HAD ANY THOUGHTS OF KILLING YOURSELF?: NO
1. IN THE PAST MONTH, HAVE YOU WISHED YOU WERE DEAD OR WISHED YOU COULD GO TO SLEEP AND NOT WAKE UP?: NO
6. HAVE YOU EVER DONE ANYTHING, STARTED TO DO ANYTHING, OR PREPARED TO DO ANYTHING TO END YOUR LIFE?: NO

## 2025-06-02 ASSESSMENT — PAIN SCALES - GENERAL
PAINLEVEL_OUTOF10: 4
PAINLEVEL_OUTOF10: 0 - NO PAIN

## 2025-06-02 ASSESSMENT — PAIN DESCRIPTION - ORIENTATION: ORIENTATION: LEFT

## 2025-06-02 ASSESSMENT — PAIN - FUNCTIONAL ASSESSMENT
PAIN_FUNCTIONAL_ASSESSMENT: 0-10
PAIN_FUNCTIONAL_ASSESSMENT: 0-10

## 2025-06-02 ASSESSMENT — PAIN DESCRIPTION - LOCATION: LOCATION: HEAD

## 2025-06-02 NOTE — DISCHARGE INSTRUCTIONS
Please follow-up closely with your primary care doctor.  Continue to take your medications as indicated following the time instructed.  Return immediately if concerning symptoms, as discussed.

## 2025-06-02 NOTE — ED TRIAGE NOTES
Pt arrives to ED from home for fall yesterday around 0200. Pt states he was sleeping and rolled out of bed and hit his head on the floor. Pt is currently on Eliquis for a fib. Pt states he has been having headaches. Pt denies visual changes or dizziness.

## 2025-06-02 NOTE — ED PROVIDER NOTES
Emergency Department Provider Note     HPI  Patient is a 79-year-old male with a past medical history significant for BPH, diabetes, hypertension, atrial fibrillation on Eliquis who presents to the emergency room for headache in the setting of fall with head trauma.  He states that Saturday morning around 2 AM he rolled off the bed and hit his head on the ground.  He sustained a periorbital contusion and did not pass out.  He got up right away and went back to bed.  He has been experiencing headaches that he describes as severe since present.  They are not improved by Tylenol.  He denies any visual field cuts, diplopia, dizziness, lightheadedness, neurologic deficits.  Denies any neck or back pain.  Has been ambulatory without difficulty since then.      PMHx: As above  PSHx: Denies pertinent  FamilyHx: Denies prior  SocialHx: Denies  Allergies: NKDA  Medications: See Medication Reconciliation     ROS  As above otherwise denies      Physical Exam    GENERAL: Awake and Alert, No Acute Distress  HEENT: Periorbital swelling to the left eyebrow with intact extraocular movement AT/NC, PERRL, EOMI, Normal Oropharynx, No Signs of Dehydration  NECK: Normal Inspection, No JVD  CARDIOVASCULAR: RRR, No M/R/G  RESPIRATORY: CTA Bilaterally, No Wheezes, Rales or Rhonchi, Chest Wall Non-tender  ABDOMEN: Soft, non-tender abdomen, Normal Bowel Sounds, No Distention  BACK: No CVA Tenderness  SKIN: Normal Color, Warm, Dry, No Rashes   EXTREMITIES: Non-Tender, Full ROM, No Pedal Edema  NEURO: A&O x 3, Normal Motor and Sensation, Normal Mood and Affect    Nursing Assessment and Vitals Reviewed      EKG showed a normal sinus rhythm at 92 bpm.  There is left axis deviation.  No ischemic ST changes    Medical Decision  Patient is a 79-year-old male with a past medical history significant for BPH, diabetes, hypertension, atrial fibrillation on Eliquis who presents to the emergency room for headache in the setting of fall with head trauma.   He states that Saturday morning around 2 AM he rolled off the bed and hit his head on the ground.  He sustained a periorbital contusion and did not pass out.  He got up right away and went back to bed.  He has been experiencing headaches that he describes as severe since present.  They are not improved by Tylenol.  He denies any visual field cuts, diplopia, dizziness, lightheadedness, neurologic deficits.  Denies any neck or back pain.  Has been ambulatory without difficulty since then.      On evaluation patient is very well-appearing and in no acute distress.  Staff in triage room 3 after an HIA was activated.  At that time he is smiling, laughing interacting And answering questions appropriately.  He is neurologically intact without C, T, L-spine tenderness.  He has periorbital swelling to the left eye with an overlying abrasion over hematoma to the left forehead but no laceration.  Extraocular muscles are intact.  He is taken to CT imaging for further evaluation given the reports of headaches on anticoagulation.    Workup for patient included labs that did not reveal any emergent electrolyte imbalance.  He has no leukocytosis or significant anemia.  Urinalysis is negative.  CT of the head, face, C-spine did not reveal any acute traumatic injury or facial bone fracture.  Patient blood pressure started to improve after he was given his missed home medications.  Heart rate was initially slightly elevated but on my final evaluation was 95.  His headache has been completely resolved with medication.  He feels well and would like to go home.  He is instructed on supportive care at home including signs and symptoms that should prompt an immediate turn to emergency room.  He will follow-up closely with his primary care doctor.      Chinyere Mccray MD  Emergency Medicine                                                            Chinyere Mccray MD  06/02/25 [id number]       Chinyere VILLEGAS  MD Mahendra  06/02/25 [id number]

## 2025-06-03 ENCOUNTER — APPOINTMENT (OUTPATIENT)
Dept: UROLOGY | Facility: CLINIC | Age: 80
End: 2025-06-03
Payer: MEDICARE

## 2025-06-03 DIAGNOSIS — E78.2 MIXED HYPERLIPIDEMIA: ICD-10-CM

## 2025-06-03 RX ORDER — ATORVASTATIN CALCIUM 10 MG/1
10 TABLET, FILM COATED ORAL DAILY
Qty: 90 TABLET | Refills: 2 | Status: SHIPPED | OUTPATIENT
Start: 2025-06-03

## 2025-06-04 ENCOUNTER — APPOINTMENT (OUTPATIENT)
Dept: RADIOLOGY | Facility: CLINIC | Age: 80
End: 2025-06-04
Payer: MEDICARE

## 2025-06-04 ASSESSMENT — PROMIS GLOBAL HEALTH SCALE
CARRYOUT_SOCIAL_ACTIVITIES: EXCELLENT
RATE_MENTAL_HEALTH: EXCELLENT
RATE_PHYSICAL_HEALTH: VERY GOOD
CARRYOUT_PHYSICAL_ACTIVITIES: COMPLETELY
EMOTIONAL_PROBLEMS: RARELY
RATE_GENERAL_HEALTH: VERY GOOD
RATE_AVERAGE_PAIN: 0
RATE_SOCIAL_SATISFACTION: EXCELLENT
RATE_QUALITY_OF_LIFE: EXCELLENT
RATE_AVERAGE_FATIGUE: MODERATE

## 2025-06-06 ENCOUNTER — PATIENT OUTREACH (OUTPATIENT)
Dept: CARE COORDINATION | Facility: CLINIC | Age: 80
End: 2025-06-06
Payer: MEDICARE

## 2025-06-06 NOTE — PROGRESS NOTES
ED visit outreach call to patient to check in to support smooth transition of care. Patient has follow-up visit scheduled with Dr. Peña on 6/9.  Patient states he continues to have headaches off and on, uses extra strength tylenol which provides some relief.  Patient with no additional needs noted. No additional outreach needed at this time.  CM will dis enroll from [facility] program.    INEZ QuirozN, RN   [phone]   [facility] Department

## 2025-06-09 ENCOUNTER — APPOINTMENT (OUTPATIENT)
Dept: PRIMARY CARE | Facility: CLINIC | Age: 80
End: 2025-06-09
Payer: MEDICARE

## 2025-06-09 VITALS — DIASTOLIC BLOOD PRESSURE: 75 MMHG | SYSTOLIC BLOOD PRESSURE: 135 MMHG

## 2025-06-09 DIAGNOSIS — S09.90XD INJURY OF HEAD, SUBSEQUENT ENCOUNTER: ICD-10-CM

## 2025-06-09 DIAGNOSIS — I10 BENIGN ESSENTIAL HYPERTENSION: ICD-10-CM

## 2025-06-09 DIAGNOSIS — G44.209 TENSION HEADACHE: Primary | ICD-10-CM

## 2025-06-09 PROCEDURE — 3075F SYST BP GE 130 - 139MM HG: CPT | Performed by: INTERNAL MEDICINE

## 2025-06-09 PROCEDURE — 99213 OFFICE O/P EST LOW 20 MIN: CPT | Performed by: INTERNAL MEDICINE

## 2025-06-09 PROCEDURE — 3078F DIAST BP <80 MM HG: CPT | Performed by: INTERNAL MEDICINE

## 2025-06-09 NOTE — PROGRESS NOTES
Subjective   Patient ID: Guido Bell is a 79 y.o. male who presents for No chief complaint on file..    HPI follow-up visit it may or may not be his Medicare wellness visit as in between visits had fall went to er as he had fallen out of bed and suffered a head injury he had been following up with a urologist for the blood in the urine was due for his CT scan and a cystoscopy he is on Tylenol for the head and headache    Review of Systems    Objective   There were no vitals taken for this visit.    Physical Exam vital signs noted alert and oriented x 3 NCAT large egg shaped swelling on the left parietal area extensive ecchymoses down to the chest wall no JVD chest clear to auscultation CV regular rate and rhythm S1-S2 extremities no clubbing cyanosis no change edema    Assessment/Plan        Impression head injury status post fall out of bed other diagnoses htn  Plan reviewed the prior laboratory results and imaging follow-up with the urologist continue with Tylenol as needed for the headaches stay well hydrated sun protection for the eyes try not leaning over or bending over and changing position quickly to avoid dizziness he has a bed rail now for preventing falling out of bed continue with blood pressure management and medication and then recheck based on above.  Follow-up for CPE

## 2025-06-11 ENCOUNTER — OFFICE VISIT (OUTPATIENT)
Dept: CARDIOLOGY | Facility: HOSPITAL | Age: 80
End: 2025-06-11
Payer: MEDICARE

## 2025-06-11 ENCOUNTER — HOSPITAL ENCOUNTER (OUTPATIENT)
Dept: RADIOLOGY | Facility: CLINIC | Age: 80
End: 2025-06-11
Payer: MEDICARE

## 2025-06-11 ENCOUNTER — HOSPITAL ENCOUNTER (OUTPATIENT)
Dept: CARDIOLOGY | Facility: HOSPITAL | Age: 80
Discharge: HOME | End: 2025-06-11
Payer: MEDICARE

## 2025-06-11 VITALS
BODY MASS INDEX: 28.09 KG/M2 | SYSTOLIC BLOOD PRESSURE: 168 MMHG | HEIGHT: 67 IN | WEIGHT: 179 LBS | DIASTOLIC BLOOD PRESSURE: 96 MMHG | HEART RATE: 89 BPM

## 2025-06-11 DIAGNOSIS — I71.21 ANEURYSM OF ASCENDING AORTA WITHOUT RUPTURE: Chronic | ICD-10-CM

## 2025-06-11 DIAGNOSIS — I48.0 PAROXYSMAL ATRIAL FIBRILLATION (MULTI): Primary | ICD-10-CM

## 2025-06-11 DIAGNOSIS — I71.21 ANEURYSM OF ASCENDING AORTA WITHOUT RUPTURE: ICD-10-CM

## 2025-06-11 DIAGNOSIS — R31.0 GROSS HEMATURIA: ICD-10-CM

## 2025-06-11 DIAGNOSIS — I77.810 THORACIC AORTIC ECTASIA: ICD-10-CM

## 2025-06-11 LAB
AORTIC VALVE MEAN GRADIENT: 2 MMHG
AORTIC VALVE PEAK VELOCITY: 1.09 M/S
AV PEAK GRADIENT: 5 MMHG
AVA (PEAK VEL): 3.75 CM2
AVA (VTI): 3.89 CM2
EJECTION FRACTION APICAL 4 CHAMBER: 49.3
EJECTION FRACTION: 78 %
LEFT ATRIUM VOLUME AREA LENGTH INDEX BSA: 27.4 ML/M2
LEFT VENTRICLE INTERNAL DIMENSION DIASTOLE: 4.11 CM (ref 3.5–6)
LEFT VENTRICULAR OUTFLOW TRACT DIAMETER: 2.33 CM
MITRAL VALVE E/A RATIO: 0.39
RIGHT VENTRICLE PEAK SYSTOLIC PRESSURE: 19 MMHG
TRICUSPID ANNULAR PLANE SYSTOLIC EXCURSION: 1.6 CM

## 2025-06-11 PROCEDURE — 93005 ELECTROCARDIOGRAM TRACING: CPT | Performed by: INTERNAL MEDICINE

## 2025-06-11 PROCEDURE — 93306 TTE W/DOPPLER COMPLETE: CPT

## 2025-06-11 PROCEDURE — 99214 OFFICE O/P EST MOD 30 MIN: CPT | Performed by: INTERNAL MEDICINE

## 2025-06-11 PROCEDURE — 93306 TTE W/DOPPLER COMPLETE: CPT | Performed by: INTERNAL MEDICINE

## 2025-06-11 PROCEDURE — 76377 3D RENDER W/INTRP POSTPROCES: CPT | Performed by: STUDENT IN AN ORGANIZED HEALTH CARE EDUCATION/TRAINING PROGRAM

## 2025-06-11 PROCEDURE — 3080F DIAST BP >= 90 MM HG: CPT | Performed by: INTERNAL MEDICINE

## 2025-06-11 PROCEDURE — 76377 3D RENDER W/INTRP POSTPROCES: CPT

## 2025-06-11 PROCEDURE — 74178 CT ABD&PLV WO CNTR FLWD CNTR: CPT | Performed by: STUDENT IN AN ORGANIZED HEALTH CARE EDUCATION/TRAINING PROGRAM

## 2025-06-11 PROCEDURE — 99213 OFFICE O/P EST LOW 20 MIN: CPT | Mod: 25 | Performed by: INTERNAL MEDICINE

## 2025-06-11 PROCEDURE — 3077F SYST BP >= 140 MM HG: CPT | Performed by: INTERNAL MEDICINE

## 2025-06-11 PROCEDURE — 1159F MED LIST DOCD IN RCRD: CPT | Performed by: INTERNAL MEDICINE

## 2025-06-11 PROCEDURE — 2550000001 HC RX 255 CONTRASTS: Performed by: UROLOGY

## 2025-06-11 RX ADMIN — IOHEXOL 75 ML: 350 INJECTION, SOLUTION INTRAVENOUS at 12:28

## 2025-06-11 ASSESSMENT — ENCOUNTER SYMPTOMS
LOSS OF SENSATION IN FEET: 0
DEPRESSION: 0
OCCASIONAL FEELINGS OF UNSTEADINESS: 0

## 2025-06-11 NOTE — PROGRESS NOTES
"Chief Complaint:   Follow-up (ECHO completed this visit)     History Of Present Illness:    Guido Bell is a 79 y.o. male here for followup. After an episode of persistent palpitations with malaise 5/2018 he was diagnosed with atrial fibrillation. Spontaneously converted to NSR. Had rate control with diltiazem gtt which was ultimately converted to oral. Was started on Eliquis for AC. He has been struggling with orthostatic intolerance and periods of intermittent exhaustion after physical exertion.    He reports doing well apart from a fall that lead to some facial bruising. He denies cardiovascular symptoms.       Last Recorded Vitals:  Vitals:    06/11/25 0914   BP: (!) 168/96   BP Location: Right arm   Patient Position: Sitting   BP Cuff Size: Adult   Pulse: 89   Weight: 81.2 kg (179 lb)   Height: 1.702 m (5' 7\")                 Allergies:  Patient has no known allergies.    Outpatient Medications:  Current Outpatient Medications   Medication Instructions    apixaban (ELIQUIS) 5 mg, oral, 2 times daily    atorvastatin (LIPITOR) 10 mg, oral, Daily    blood sugar diagnostic (Accu-Chek Teresa Plus test strp) strip TEST AS DIRECTED, EVERY OTHER DAY AND AS NEEDED FOR LOW BLOOD SUGAR.    cyanocobalamin, vitamin B-12, (VITAMIN B-12 ORAL) Take by mouth.    dilTIAZem ER (TIADYLT ER) 360 mg, oral, Daily    DULoxetine (CYMBALTA) 30 mg, oral, Nightly    finasteride (PROSCAR) 5 mg, oral, Daily    FreeStyle Lite Strips strip USE AS DIRECTED 1X PER DAY    losartan (COZAAR) 25 mg, oral, Daily    melatonin 3 mg, oral, Nightly    metFORMIN (GLUCOPHAGE) 1,000 mg, oral, 2 times daily    vit C/E/Zn/coppr/lutein/zeaxan (PRESERVISION AREDS-2 ORAL) Take by mouth.         Physical Exam:  Gen Well appearing late septuagenarian male sitting up in NAD. Body mass index is 28.04 kg/m².   CV rrr. No m/r/g appreciated. No JVD or leg edema.    Pulm Lungs clear with normal respiratory effort.  Neuro Alert and conversant. Grossly nonfocal. "       I reviewed most recent imaging / labs / recent event monitor and office notes.    I reviewed (and read) the patient's echocardiogram (see [facility] for report)     I reviewed the patient's ECG from 6/2/2025.       Assessment/Plan   1. Atrial fibrillation  Paroxysmal. No recent episodes. He has been symptomatic with recurrences in the past despite evidence of rate control. We had discussed rhythm control options and he has declined citing overall tolerance for recurrences. He was previously cautioned that in the future his episodes may be longer lasting and may not be tolerable and that rhythm control could be initiated should that occur. He is on Eliquis for anticoagulation despite his prior bleeding issues and appears to be tolerating it well. Monitoring.      2. Hypertension   BP sub-optimal but has known white coat hypertension and a significant degree of orthostatic intolerance which makes BP management difficult. His present regimen is to continue.     3. Aortic aneurysm  Mild. Stable. For yearly repeat echocardiograms (next 6/2026). On statin therapy. BP management as above.        Followup next June with a same day echocardiogram.         Luis Fonseca MD

## 2025-06-14 LAB
ATRIAL RATE: 92 BPM
P AXIS: 34 DEGREES
P OFFSET: 197 MS
P ONSET: 133 MS
PR INTERVAL: 188 MS
Q ONSET: 227 MS
QRS COUNT: 15 BEATS
QRS DURATION: 86 MS
QT INTERVAL: 376 MS
QTC CALCULATION(BAZETT): 464 MS
QTC FREDERICIA: 433 MS
R AXIS: 2 DEGREES
T AXIS: 48 DEGREES
T OFFSET: 415 MS
VENTRICULAR RATE: 92 BPM

## 2025-06-20 ENCOUNTER — APPOINTMENT (OUTPATIENT)
Dept: UROLOGY | Facility: CLINIC | Age: 80
End: 2025-06-20
Payer: MEDICARE

## 2025-06-20 ENCOUNTER — TELEPHONE (OUTPATIENT)
Dept: CARDIOLOGY | Facility: HOSPITAL | Age: 80
End: 2025-06-20

## 2025-06-20 VITALS — HEART RATE: 81 BPM | DIASTOLIC BLOOD PRESSURE: 100 MMHG | SYSTOLIC BLOOD PRESSURE: 171 MMHG

## 2025-06-20 DIAGNOSIS — N13.8 BPH WITH OBSTRUCTION/LOWER URINARY TRACT SYMPTOMS: ICD-10-CM

## 2025-06-20 DIAGNOSIS — N40.1 BPH WITH OBSTRUCTION/LOWER URINARY TRACT SYMPTOMS: ICD-10-CM

## 2025-06-20 DIAGNOSIS — R31.0 GROSS HEMATURIA: Primary | ICD-10-CM

## 2025-06-20 LAB
POC APPEARANCE, URINE: CLEAR
POC BILIRUBIN, URINE: NEGATIVE
POC BLOOD, URINE: ABNORMAL
POC COLOR, URINE: YELLOW
POC GLUCOSE, URINE: ABNORMAL MG/DL
POC KETONES, URINE: NEGATIVE MG/DL
POC LEUKOCYTES, URINE: NEGATIVE
POC NITRITE,URINE: NEGATIVE
POC PH, URINE: 6.5 PH
POC PROTEIN, URINE: ABNORMAL MG/DL
POC SPECIFIC GRAVITY, URINE: 1.02
POC UROBILINOGEN, URINE: 0.2 EU/DL

## 2025-06-20 PROCEDURE — 99214 OFFICE O/P EST MOD 30 MIN: CPT | Performed by: UROLOGY

## 2025-06-20 PROCEDURE — 81003 URINALYSIS AUTO W/O SCOPE: CPT | Performed by: UROLOGY

## 2025-06-20 PROCEDURE — 52000 CYSTOURETHROSCOPY: CPT | Performed by: UROLOGY

## 2025-06-20 NOTE — TELEPHONE ENCOUNTER
Patient called saying he had a CT scan and the doctor was going to forward the information over to you because it noted severe coronary artery calcifications. Patient was expecting a call regarding this?

## 2025-06-20 NOTE — PROGRESS NOTES
Scribed for Dr. Angelia Sweet by Lakhwinder Thrasher. I, Dr. Angelia Sweet, have personally reviewed and agreed with the information entered by the Virtual Scribe. 06/20/25    History of Present Illness (HPI):  TODAY: (06/20/25)  Guido Bell is a 79 y.o. male with history of BPH with LUTS, and 1x episode of gross hematuria (now resolved. C/o urinary frequency, urgency despite tamsulosin, but not very bothersome (IPSS 15, 5). Urine cytology was negative (05/27/25). CTU (06/11/25) showed a urinary bladder diverticuli, otherwise no suspicious renal or ureteral mass, stone or lesion; no hydronephrosis bilaterally. Presents for cystoscopy as part of his hematuria work-up.     CTU (06/11/25) personally reviewed and independently interpreted.  1.  No suspicious renal mass, abnormal enhancement, calculi or  hydronephrosis.  2. Urinary bladder diverticuli.  3. 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 Recommend further evaluation with MRCP.  4. Other findings as described above.    Cystoscopy performed:   Guido Bell identified using two (2) forms of identification.  Procedure: diagnostic cystourethroscopy  Indications for procedure: Gross hematuria  Risks, benefits, and alternatives were discussed in detail.   Patient appears to understand and agrees to proceed.   Patient has signed the procedure consent form.     Cystoscopy findings:  Urethra: normal course and caliber, no evidence of stricture or lesion.  Prostate: lateral hypertrophy, no obstructing median lobe. Good sphincter coaptation.   Active bleeding noted from prostatic surface; hematuria likely 2/2 prostatic vessels.   Bladder: normal capacity, slightly trabeculated bladder wall, no diverticulum, no stone, tumors or other lesions.    Medical History[1]  Surgical History[2]  Family History[3]  Tobacco Use History[4]  Current Medications[5]  Allergies[6]  Past medical, surgical, family and social history in the chart was reviewed and is  accurate including any additions to what is in this HPI.    Review of systems (ROS):   Pertinent information as listed in the HPI.      Objective   There were no vitals taken for this visit.  Physical Exam:  Constitutional: NAD  HEENT: AT/NC  Resp: Non labored respirations.  Skin: No jaundice or visible skin lesions.  Neuro: No focal deficits.  Psych: Appropriate mood and affect.    Lab Review:  Lab Results   Component Value Date    WBC 7.1 06/02/2025    WBC 7.2 05/13/2025    RBC 4.43 (L) 06/02/2025    RBC 4.45 05/13/2025    HGB 13.2 (L) 06/02/2025    HGB 13.2 05/13/2025    HCT 41.0 06/02/2025    HCT 41.1 05/13/2025     06/02/2025     05/13/2025      Lab Results   Component Value Date    BUN 23 06/02/2025    BUN 27 (H) 05/13/2025    CREATININE 1.07 06/02/2025    CREATININE 1.29 (H) 05/13/2025      Lab Results   Component Value Date    PSA 1.04 05/13/2025    HGBA1C 7.1 (H) 05/13/2025    HGBA1C 6.8 (H) 12/23/2019     Lab Results   Component Value Date    CHOL 122 05/13/2025    TRIG 109 05/13/2025    HDL 42 05/13/2025    ALT 17 06/02/2025    AST 15 06/02/2025     06/02/2025    K 3.9 06/02/2025     06/02/2025    CO2 25 06/02/2025    TSH 1.87 02/28/2023    INR 1.1 06/02/2025        ASSESSMENT:  Problem List Items Addressed This Visit       Gross hematuria - Primary    Relevant Orders    POCT UA Automated manually resulted      PLAN:  #Gross hematuria  S/p hematuria work-up which was negative for malignancy or concerning pathology.   Hematuria source most likely 2/2 prostatic vessels; see cystoscopy above.   No further work-up indicated at this time.     #BPH with LUTS  Denotes benefit with continuing tamsulosin for his BPH.   His urinary symptoms have remained stable, and not bothersome.   Tolerating the medication without side-effects.   Discussed risks of continued use and alternative treatment options.   Patient elects to continue tamsulosin, Rx refill sent to pharmacy.   Will reevaluate plan  on an annual basis.    I spent 30 minutes of dedicated E&M time, including preparation and review of records, notes, and data, time spent with patient/family, and documentation.       All questions were answered to the patient’s satisfaction.  Patient agrees with the plan and wishes to proceed.  Continue follow-up for ongoing care of his chronic medical conditions.    Scribed for Dr. Angelia Sweet by Lakhwinder Thrasher. I, Dr. Angelia Sweet, have personally reviewed and agreed with the information entered by the Virtual Scribe. 06/20/25         [1]   Past Medical History:  Diagnosis Date    Elevated prostate specific antigen (PSA)     Elevated prostate specific antigen (PSA)    Other disorders of intestinal carbohydrate absorption     Other disorders of intestinal carbohydrate absorption    Personal history of other diseases of male genital organs     History of benign prostatic hypertrophy    Personal history of other diseases of the circulatory system     History of hypertension    Personal history of other diseases of the nervous system and sense organs 09/09/2018    History of tension headache    Personal history of other endocrine, nutritional and metabolic disease     History of diabetes mellitus    Personal history of other endocrine, nutritional and metabolic disease     History of hyperlipidemia    Rectal polyp 07/11/2014    Rectal polyp    Spondylosis without myelopathy or radiculopathy, cervical region 06/02/2018    Cervical osteoarthritis   [2]   Past Surgical History:  Procedure Laterality Date    TOTAL HIP ARTHROPLASTY  12/09/2013    Total Hip Replacement   [3]   Family History  Problem Relation Name Age of Onset    Other (arteriosclerotic cardiovascular disease) Father     [4]   Social History  Tobacco Use   Smoking Status Never   Smokeless Tobacco Never   [5]   Current Outpatient Medications   Medication Sig Dispense Refill    apixaban (Eliquis) 5 mg tablet Take 1 tablet (5 mg) by mouth 2 times a day. 180  tablet 3    atorvastatin (Lipitor) 10 mg tablet TAKE 1 TABLET BY MOUTH EVERY DAY 90 tablet 2    blood sugar diagnostic (Accu-Chek Teresa Plus test strp) strip TEST AS DIRECTED, EVERY OTHER DAY AND AS NEEDED FOR LOW BLOOD SUGAR.      cyanocobalamin, vitamin B-12, (VITAMIN B-12 ORAL) Take by mouth.      dilTIAZem ER (Tiadylt ER) 360 mg 24 hr capsule Take 1 capsule (360 mg) by mouth once daily. 90 capsule 3    DULoxetine (Cymbalta) 30 mg DR capsule Take 1 capsule (30 mg) by mouth once daily at bedtime. 90 capsule 1    finasteride (Proscar) 5 mg tablet TAKE 1 TABLET BY MOUTH EVERY DAY 90 tablet 1    FreeStyle Lite Strips strip USE AS DIRECTED 1X PER  strip 3    losartan (Cozaar) 25 mg tablet TAKE 1 TABLET BY MOUTH EVERY DAY 90 tablet 3    melatonin 3 mg tablet Take 1 tablet (3 mg) by mouth once daily at bedtime. 30 tablet 2    metFORMIN (Glucophage) 1,000 mg tablet TAKE 1 TABLET BY MOUTH TWICE A  tablet 0    vit C/E/Zn/coppr/lutein/zeaxan (PRESERVISION AREDS-2 ORAL) Take by mouth.       No current facility-administered medications for this visit.   [6] No Known Allergies

## 2025-06-29 DIAGNOSIS — Z00.00 ENCOUNTER FOR GENERAL ADULT MEDICAL EXAMINATION WITHOUT ABNORMAL FINDINGS: ICD-10-CM

## 2025-07-01 ENCOUNTER — APPOINTMENT (OUTPATIENT)
Dept: UROLOGY | Facility: CLINIC | Age: 80
End: 2025-07-01
Payer: MEDICARE

## 2025-07-04 RX ORDER — DULOXETIN HYDROCHLORIDE 30 MG/1
30 CAPSULE, DELAYED RELEASE ORAL NIGHTLY
Qty: 90 CAPSULE | Refills: 1 | Status: SHIPPED | OUTPATIENT
Start: 2025-07-04

## 2025-07-05 DIAGNOSIS — E11.9 TYPE 2 DIABETES MELLITUS WITHOUT COMPLICATION, UNSPECIFIED WHETHER LONG TERM INSULIN USE: ICD-10-CM

## 2025-07-07 ENCOUNTER — APPOINTMENT (OUTPATIENT)
Dept: NEUROLOGY | Facility: CLINIC | Age: 80
End: 2025-07-07
Payer: MEDICARE

## 2025-07-07 RX ORDER — METFORMIN HYDROCHLORIDE 1000 MG/1
1000 TABLET ORAL 2 TIMES DAILY
Qty: 180 TABLET | Refills: 0 | Status: SHIPPED | OUTPATIENT
Start: 2025-07-07

## 2025-07-30 DIAGNOSIS — Z00.00 ENCOUNTER FOR GENERAL ADULT MEDICAL EXAMINATION WITHOUT ABNORMAL FINDINGS: ICD-10-CM

## 2025-08-01 RX ORDER — FINASTERIDE 5 MG/1
5 TABLET, FILM COATED ORAL DAILY
Qty: 90 TABLET | Refills: 1 | Status: SHIPPED | OUTPATIENT
Start: 2025-08-01

## 2025-12-15 ENCOUNTER — APPOINTMENT (OUTPATIENT)
Dept: UROLOGY | Facility: CLINIC | Age: 80
End: 2025-12-15
Payer: MEDICARE